# Patient Record
Sex: MALE | Race: WHITE | Employment: FULL TIME | URBAN - METROPOLITAN AREA
[De-identification: names, ages, dates, MRNs, and addresses within clinical notes are randomized per-mention and may not be internally consistent; named-entity substitution may affect disease eponyms.]

---

## 2018-01-08 ENCOUNTER — OFFICE VISIT (OUTPATIENT)
Dept: INTERNAL MEDICINE | Age: 58
End: 2018-01-08

## 2018-01-08 DIAGNOSIS — J44.9 CHRONIC OBSTRUCTIVE PULMONARY DISEASE, UNSPECIFIED COPD TYPE (HCC): ICD-10-CM

## 2018-01-08 DIAGNOSIS — F17.210 CIGARETTE SMOKER: ICD-10-CM

## 2018-01-08 DIAGNOSIS — N30.01 ACUTE CYSTITIS WITH HEMATURIA: Primary | ICD-10-CM

## 2018-01-08 LAB
BILIRUBIN, POC: NORMAL
BLOOD URINE, POC: NORMAL
CLARITY, POC: NORMAL
COLOR, POC: YELLOW
GLUCOSE URINE, POC: NORMAL
KETONES, POC: NORMAL
LEUKOCYTE EST, POC: NORMAL
NITRITE, POC: NORMAL
PH, POC: 8
PROTEIN, POC: NORMAL
SPECIFIC GRAVITY, POC: 1015
UROBILINOGEN, POC: 0.2

## 2018-01-08 PROCEDURE — 99203 OFFICE O/P NEW LOW 30 MIN: CPT | Performed by: INTERNAL MEDICINE

## 2018-01-08 PROCEDURE — 81002 URINALYSIS NONAUTO W/O SCOPE: CPT | Performed by: INTERNAL MEDICINE

## 2018-01-08 RX ORDER — CIPROFLOXACIN 250 MG/1
250 TABLET, FILM COATED ORAL 2 TIMES DAILY
Qty: 20 TABLET | Refills: 0 | Status: SHIPPED | OUTPATIENT
Start: 2018-01-08 | End: 2018-01-18

## 2018-01-09 VITALS
BODY MASS INDEX: 18.13 KG/M2 | DIASTOLIC BLOOD PRESSURE: 70 MMHG | HEIGHT: 69 IN | WEIGHT: 122.4 LBS | HEART RATE: 66 BPM | SYSTOLIC BLOOD PRESSURE: 102 MMHG

## 2018-01-09 ASSESSMENT — ENCOUNTER SYMPTOMS
RESPIRATORY NEGATIVE: 1
ABDOMINAL PAIN: 0

## 2018-01-10 LAB
ORGANISM: ABNORMAL
URINE CULTURE, ROUTINE: ABNORMAL
URINE CULTURE, ROUTINE: ABNORMAL

## 2018-01-18 ENCOUNTER — OFFICE VISIT (OUTPATIENT)
Dept: INTERNAL MEDICINE | Age: 58
End: 2018-01-18

## 2018-01-18 VITALS
WEIGHT: 131.6 LBS | DIASTOLIC BLOOD PRESSURE: 70 MMHG | HEIGHT: 68 IN | BODY MASS INDEX: 19.94 KG/M2 | SYSTOLIC BLOOD PRESSURE: 120 MMHG

## 2018-01-18 DIAGNOSIS — N39.0 URINARY TRACT INFECTION WITHOUT HEMATURIA, SITE UNSPECIFIED: Primary | ICD-10-CM

## 2018-01-18 DIAGNOSIS — A49.8 CITROBACTER INFECTION: ICD-10-CM

## 2018-01-18 DIAGNOSIS — R21 SKIN RASH: ICD-10-CM

## 2018-01-18 LAB
BILIRUBIN, POC: ABNORMAL
BLOOD URINE, POC: ABNORMAL
CLARITY, POC: CLEAR
COLOR, POC: YELLOW
GLUCOSE URINE, POC: ABNORMAL
KETONES, POC: ABNORMAL
LEUKOCYTE EST, POC: ABNORMAL
NITRITE, POC: ABNORMAL
PH, POC: 5
PROTEIN, POC: ABNORMAL
SPECIFIC GRAVITY, POC: 1015
UROBILINOGEN, POC: 0.2

## 2018-01-18 PROCEDURE — 81002 URINALYSIS NONAUTO W/O SCOPE: CPT | Performed by: INTERNAL MEDICINE

## 2018-01-18 PROCEDURE — 99213 OFFICE O/P EST LOW 20 MIN: CPT | Performed by: INTERNAL MEDICINE

## 2018-01-18 RX ORDER — SULFAMETHOXAZOLE AND TRIMETHOPRIM 800; 160 MG/1; MG/1
1 TABLET ORAL 2 TIMES DAILY
Qty: 20 TABLET | Refills: 0 | Status: SHIPPED | OUTPATIENT
Start: 2018-01-18 | End: 2018-01-31 | Stop reason: SDUPTHER

## 2018-01-21 LAB — URINE CULTURE, ROUTINE: NORMAL

## 2018-01-22 NOTE — PROGRESS NOTES
Ray Cerna is a 65 y.o. male patient who was treated on 18/2018 for a urinary tract infection for which he initially had relief. However symptoms have returned and urine again shows WBCs. He has mild discomfort. He continues to have COPD symptoms which are stable and for which he uses an inhaler. Current Outpatient Prescriptions   Medication Sig Dispense Refill    sulfamethoxazole-trimethoprim (BACTRIM DS) 800-160 MG per tablet Take 1 tablet by mouth 2 times daily for 10 days 20 tablet 0    sildenafil (VIAGRA) 100 MG tablet Take 1 tablet by mouth as needed for Erectile Dysfunction. 3 tablet 5    budesonide-formoterol (SYMBICORT) 160-4.5 MCG/ACT AERO Inhale 2 puffs into the lungs 2 times daily. 1 Inhaler 0    albuterol (PROVENTIL HFA) 108 (90 BASE) MCG/ACT inhaler Inhale 2 puffs into the lungs every 4 hours as needed for Wheezing or Shortness of Breath. 1 Inhaler 3     No current facility-administered medications for this visit. No Known Allergies    Blood pressure 120/70, height 5' 8\" (1.727 m), weight 131 lb 9.6 oz (59.7 kg). Subjective  Objective:  General Appearance:  Comfortable. Vital signs: (most recent): Blood pressure 120/70, height 5' 8\" (1.727 m), weight 131 lb 9.6 oz (59.7 kg). Vital signs are normal.  No fever. Output: Producing urine. Lungs:  Normal effort. Heart: Normal rate. Abdomen: Abdomen is soft. (No flank tenderness. ). Assessment & Plan      Although initial urine culture showed Citrobacter,now 10 days later he has recurrent symptoms and significant WBCs. Suggesting continuing infection. I have ordered Cipro 250 mg bid for 10 days   He will report .     Hans Beltran MD  1/22/2018

## 2018-01-31 ENCOUNTER — OFFICE VISIT (OUTPATIENT)
Dept: INTERNAL MEDICINE | Age: 58
End: 2018-01-31

## 2018-01-31 VITALS
HEIGHT: 69 IN | WEIGHT: 126 LBS | BODY MASS INDEX: 18.66 KG/M2 | SYSTOLIC BLOOD PRESSURE: 130 MMHG | DIASTOLIC BLOOD PRESSURE: 68 MMHG

## 2018-01-31 DIAGNOSIS — N30.01 ACUTE CYSTITIS WITH HEMATURIA: Primary | ICD-10-CM

## 2018-01-31 LAB
BILIRUBIN, POC: ABNORMAL
BLOOD URINE, POC: ABNORMAL
CLARITY, POC: ABNORMAL
COLOR, POC: YELLOW
GLUCOSE URINE, POC: ABNORMAL
KETONES, POC: ABNORMAL
LEUKOCYTE EST, POC: ABNORMAL
NITRITE, POC: ABNORMAL
PH, POC: 8
PROTEIN, POC: ABNORMAL
SPECIFIC GRAVITY, POC: 1015
UROBILINOGEN, POC: 0.2

## 2018-01-31 PROCEDURE — 99213 OFFICE O/P EST LOW 20 MIN: CPT | Performed by: INTERNAL MEDICINE

## 2018-01-31 PROCEDURE — 81002 URINALYSIS NONAUTO W/O SCOPE: CPT | Performed by: INTERNAL MEDICINE

## 2018-01-31 RX ORDER — SULFAMETHOXAZOLE AND TRIMETHOPRIM 800; 160 MG/1; MG/1
1 TABLET ORAL 2 TIMES DAILY
Qty: 12 TABLET | Refills: 0 | Status: SHIPPED | OUTPATIENT
Start: 2018-01-31 | End: 2018-02-06

## 2018-01-31 ASSESSMENT — PATIENT HEALTH QUESTIONNAIRE - PHQ9
SUM OF ALL RESPONSES TO PHQ QUESTIONS 1-9: 0
1. LITTLE INTEREST OR PLEASURE IN DOING THINGS: 0
2. FEELING DOWN, DEPRESSED OR HOPELESS: 0
SUM OF ALL RESPONSES TO PHQ9 QUESTIONS 1 & 2: 0

## 2018-01-31 NOTE — PROGRESS NOTES
Subjective:      Patient ID: Miquel Prieto is a 65 y.o. male.     HPI    Review of Systems    Objective:   Physical Exam    Assessment:      ***      Plan:      ***

## 2018-02-02 LAB — URINE CULTURE, ROUTINE: NORMAL

## 2018-02-13 ENCOUNTER — OFFICE VISIT (OUTPATIENT)
Dept: INTERNAL MEDICINE | Age: 58
End: 2018-02-13

## 2018-02-13 VITALS
BODY MASS INDEX: 18.22 KG/M2 | WEIGHT: 123 LBS | HEIGHT: 69 IN | DIASTOLIC BLOOD PRESSURE: 80 MMHG | SYSTOLIC BLOOD PRESSURE: 112 MMHG

## 2018-02-13 DIAGNOSIS — J44.9 CHRONIC OBSTRUCTIVE PULMONARY DISEASE, UNSPECIFIED COPD TYPE (HCC): ICD-10-CM

## 2018-02-13 DIAGNOSIS — G40.909 SEIZURE DISORDER (HCC): Primary | ICD-10-CM

## 2018-02-13 PROCEDURE — 99213 OFFICE O/P EST LOW 20 MIN: CPT | Performed by: INTERNAL MEDICINE

## 2018-02-13 RX ORDER — VARENICLINE TARTRATE 1 MG/1
1 TABLET, FILM COATED ORAL 2 TIMES DAILY
Qty: 60 TABLET | Refills: 3 | Status: SHIPPED | OUTPATIENT
Start: 2018-02-13 | End: 2018-05-29 | Stop reason: SDUPTHER

## 2018-02-13 NOTE — PROGRESS NOTES
Outpatient Follow Up Note    Subjective:   CHIEF COMPLAINT / HPI:     Andrei Brenner  presents today  for follow up on a recent diagnosis of seizure disorder for which he was hospitalized and treated with Keppra. He has had no recurrent seizures and feels well on the medication. He has a history of COPD from cigarette use and has decided to stop smoking. Past Medical History:    Past Medical History:   Diagnosis Date    COPD (chronic obstructive pulmonary disease) ([facility] [address] 4/22/2014    Erectile dysfunction 4/22/2014       Social History:    History   Smoking Status    Current Every Day Smoker    Packs/day: 2.00    Start date: 1/9/1965   Smokeless Tobacco    Never Used         No family history on file. Current Medications:  Current Outpatient Prescriptions on File Prior to Visit   Medication Sig Dispense Refill    sildenafil (VIAGRA) 100 MG tablet Take 1 tablet by mouth as needed for Erectile Dysfunction. 3 tablet 5    budesonide-formoterol (SYMBICORT) 160-4.5 MCG/ACT AERO Inhale 2 puffs into the lungs 2 times daily. 1 Inhaler 0    albuterol (PROVENTIL HFA) 108 (90 BASE) MCG/ACT inhaler    Keppra 500 mg twice a day Inhale 2 puffs into the lungs every 4 hours as needed for Wheezing or Shortness of Breath. 1 Inhaler 3     No current facility-administered medications on file prior to visit. Allergies   Allergen Reactions    Bactrim [Sulfamethoxazole-Trimethoprim] Shortness Of Breath     Pt is extremely allergic to this medication, can cause seizures. REVIEW OF SYSTEMS:      CONSTITUTIONAL: No major weight gain or loss, fatigue, weakness, night sweats or fever. HEENT: No new vision difficulties or ringing in the ears. No congestion or sore throat. RESPIRATORY: No new SOB, PND, orthopnea or cough. CARDIOVASCULAR: No chest pain or palpitations. GI: No nausea, vomiting, diarrhea, constipation, abdominal pain or changes in bowel habits.   : No urinary frequency, urgency, incontinence

## 2018-05-29 RX ORDER — VARENICLINE TARTRATE 1 MG/1
TABLET, FILM COATED ORAL
Qty: 56 TABLET | Refills: 1 | Status: SHIPPED | OUTPATIENT
Start: 2018-05-29 | End: 2020-02-11

## 2019-10-17 ENCOUNTER — HOSPITAL ENCOUNTER (OUTPATIENT)
Age: 59
Discharge: HOME | End: 2019-10-17
Payer: COMMERCIAL

## 2019-10-17 ENCOUNTER — HOSPITAL ENCOUNTER (OUTPATIENT)
Dept: GENERAL RADIOLOGY | Age: 59
Discharge: HOME | End: 2019-10-17
Payer: COMMERCIAL

## 2019-10-17 DIAGNOSIS — M79.18 DIFFUSE MYOFASCIAL PAIN SYNDROME: ICD-10-CM

## 2019-10-17 DIAGNOSIS — M54.16 LUMBAR RADICULOPATHY: ICD-10-CM

## 2019-10-17 DIAGNOSIS — M54.9 DORSALGIA: ICD-10-CM

## 2019-10-17 PROCEDURE — 72110 X-RAY EXAM L-2 SPINE 4/>VWS: CPT

## 2020-02-11 ENCOUNTER — OFFICE VISIT (OUTPATIENT)
Dept: INTERNAL MEDICINE CLINIC | Age: 60
End: 2020-02-11
Payer: COMMERCIAL

## 2020-02-11 VITALS
WEIGHT: 122 LBS | SYSTOLIC BLOOD PRESSURE: 112 MMHG | DIASTOLIC BLOOD PRESSURE: 70 MMHG | HEIGHT: 69 IN | BODY MASS INDEX: 18.07 KG/M2

## 2020-02-11 PROCEDURE — 99396 PREV VISIT EST AGE 40-64: CPT | Performed by: INTERNAL MEDICINE

## 2020-02-11 SDOH — ECONOMIC STABILITY: INCOME INSECURITY: HOW HARD IS IT FOR YOU TO PAY FOR THE VERY BASICS LIKE FOOD, HOUSING, MEDICAL CARE, AND HEATING?: NOT HARD AT ALL

## 2020-02-11 SDOH — ECONOMIC STABILITY: FOOD INSECURITY: WITHIN THE PAST 12 MONTHS, THE FOOD YOU BOUGHT JUST DIDN'T LAST AND YOU DIDN'T HAVE MONEY TO GET MORE.: NEVER TRUE

## 2020-02-11 SDOH — ECONOMIC STABILITY: TRANSPORTATION INSECURITY
IN THE PAST 12 MONTHS, HAS LACK OF TRANSPORTATION KEPT YOU FROM MEETINGS, WORK, OR FROM GETTING THINGS NEEDED FOR DAILY LIVING?: NO

## 2020-02-11 SDOH — ECONOMIC STABILITY: FOOD INSECURITY: WITHIN THE PAST 12 MONTHS, YOU WORRIED THAT YOUR FOOD WOULD RUN OUT BEFORE YOU GOT MONEY TO BUY MORE.: NEVER TRUE

## 2020-02-11 SDOH — ECONOMIC STABILITY: TRANSPORTATION INSECURITY
IN THE PAST 12 MONTHS, HAS THE LACK OF TRANSPORTATION KEPT YOU FROM MEDICAL APPOINTMENTS OR FROM GETTING MEDICATIONS?: NO

## 2020-02-11 ASSESSMENT — PATIENT HEALTH QUESTIONNAIRE - PHQ9
SUM OF ALL RESPONSES TO PHQ QUESTIONS 1-9: 0
1. LITTLE INTEREST OR PLEASURE IN DOING THINGS: 0
2. FEELING DOWN, DEPRESSED OR HOPELESS: 0
SUM OF ALL RESPONSES TO PHQ QUESTIONS 1-9: 0
SUM OF ALL RESPONSES TO PHQ9 QUESTIONS 1 & 2: 0

## 2020-02-11 NOTE — PROGRESS NOTES
Reflexes normal.   Psychiatric:         Speech: Speech normal.         Behavior: Behavior normal. Behavior is cooperative. Thought Content: Thought content normal.         Judgment: Judgment normal.         No flowsheet data found. Lab Results   Component Value Date    GLUCOSE 88 02/14/2020       The ASCVD Risk score (Danii White, et al., 2013) failed to calculate for the following reasons:    Cannot find a previous HDL lab    Cannot find a previous total cholesterol lab      There is no immunization history on file for this patient. Health Maintenance   Topic Date Due    Pneumococcal 0-64 years Vaccine (1 of 1 - PPSV23) 07/03/1967    Low dose CT lung screening  07/03/2016    DTaP/Tdap/Td vaccine (1 - Tdap) 03/03/2020 (Originally 7/3/1972)    Lipid screen  02/11/2021 (Originally 7/3/2001)    Flu vaccine (1) 02/11/2021 (Originally 9/1/2019)    Shingles Vaccine (1 of 2) 02/11/2021 (Originally 7/3/2011)    Hepatitis C screen  02/11/2021 (Originally 1960)    HIV screen  02/11/2021 (Originally 7/3/1976)    Colon cancer screen sigmoidoscopy/CT colonography  04/13/2023    Hepatitis A vaccine  Aged Out    Hepatitis B vaccine  Aged Out    Hib vaccine  Aged Out    Meningococcal (ACWY) vaccine  Aged Out       ASSESSMENT/PLAN:  1. Routine general medical examination at a health care facility  - Discussed age appropriate preventive care including healthy diet, daily exercise, immunizations and age & gender guided screening tests. 2. Screening cholesterol level  - due for check  - Lipid Panel; Future    3. Chronic obstructive pulmonary disease, unspecified COPD type ([facility] [address]  - stable, has quit smoking  - has albuterol he can use if needed      Return in about 1 year (around 2/11/2021) for CPE.       May proceed with planned surgery without further testing  Sukhjinder Harmon 2/18/2020

## 2020-02-14 ENCOUNTER — HOSPITAL ENCOUNTER (OUTPATIENT)
Dept: PREADMISSION TESTING | Age: 60
Discharge: HOME | End: 2020-02-18
Payer: COMMERCIAL

## 2020-02-14 ENCOUNTER — HOSPITAL ENCOUNTER (OUTPATIENT)
Dept: GENERAL RADIOLOGY | Age: 60
Discharge: HOME | End: 2020-02-14
Payer: COMMERCIAL

## 2020-02-14 ENCOUNTER — HOSPITAL ENCOUNTER (OUTPATIENT)
Dept: CT IMAGING | Age: 60
Discharge: HOME | End: 2020-02-14
Payer: COMMERCIAL

## 2020-02-14 LAB
ABO/RH: NORMAL
ANION GAP SERPL CALCULATED.3IONS-SCNC: 15 MMOL/L (ref 3–16)
ANTIBODY SCREEN: NORMAL
APTT: 33 SEC (ref 24.2–36.2)
BASOPHILS ABSOLUTE: 0.1 K/UL (ref 0–0.2)
BASOPHILS RELATIVE PERCENT: 1.9 %
BILIRUBIN URINE: NEGATIVE
BLOOD, URINE: NEGATIVE
BUN BLDV-MCNC: 7 MG/DL (ref 7–20)
CALCIUM SERPL-MCNC: 9.1 MG/DL (ref 8.3–10.6)
CHLORIDE BLD-SCNC: 98 MMOL/L (ref 99–110)
CLARITY: CLEAR
CO2: 28 MMOL/L (ref 21–32)
COLOR: YELLOW
CREAT SERPL-MCNC: 0.5 MG/DL (ref 0.9–1.3)
EOSINOPHILS ABSOLUTE: 0.1 K/UL (ref 0–0.6)
EOSINOPHILS RELATIVE PERCENT: 1 %
EPITHELIAL CELLS, UA: 0 /HPF (ref 0–5)
GFR AFRICAN AMERICAN: >60
GFR NON-AFRICAN AMERICAN: >60
GLUCOSE BLD-MCNC: 88 MG/DL (ref 70–99)
GLUCOSE URINE: NEGATIVE MG/DL
HCT VFR BLD CALC: 49.4 % (ref 40.5–52.5)
HEMOGLOBIN: 16.7 G/DL (ref 13.5–17.5)
HYALINE CASTS: 1 /LPF (ref 0–8)
INR BLD: 0.86 (ref 0.86–1.14)
KETONES, URINE: ABNORMAL MG/DL
LEUKOCYTE ESTERASE, URINE: NEGATIVE
LYMPHOCYTES ABSOLUTE: 1.2 K/UL (ref 1–5.1)
LYMPHOCYTES RELATIVE PERCENT: 23.8 %
MCH RBC QN AUTO: 33.9 PG (ref 26–34)
MCHC RBC AUTO-ENTMCNC: 33.8 G/DL (ref 31–36)
MCV RBC AUTO: 100.2 FL (ref 80–100)
MICROSCOPIC EXAMINATION: YES
MONOCYTES ABSOLUTE: 0.4 K/UL (ref 0–1.3)
MONOCYTES RELATIVE PERCENT: 8.6 %
NEUTROPHILS ABSOLUTE: 3.3 K/UL (ref 1.7–7.7)
NEUTROPHILS RELATIVE PERCENT: 64.7 %
NITRITE, URINE: NEGATIVE
PDW BLD-RTO: 13 % (ref 12.4–15.4)
PH UA: 6 (ref 5–8)
PLATELET # BLD: 216 K/UL (ref 135–450)
PMV BLD AUTO: 7.5 FL (ref 5–10.5)
POTASSIUM SERPL-SCNC: 4.3 MMOL/L (ref 3.5–5.1)
PROTEIN UA: 30 MG/DL
PROTHROMBIN TIME: 10 SEC (ref 10–13.2)
RBC # BLD: 4.93 M/UL (ref 4.2–5.9)
RBC UA: 0 /HPF (ref 0–4)
SEDIMENTATION RATE, ERYTHROCYTE: 2 MM/HR (ref 0–20)
SODIUM BLD-SCNC: 141 MMOL/L (ref 136–145)
SPECIFIC GRAVITY UA: 1.01 (ref 1–1.03)
URINE REFLEX TO CULTURE: ABNORMAL
URINE TYPE: ABNORMAL
UROBILINOGEN, URINE: 1 E.U./DL
WBC # BLD: 5.1 K/UL (ref 4–11)
WBC UA: 0 /HPF (ref 0–5)

## 2020-02-14 PROCEDURE — 86850 RBC ANTIBODY SCREEN: CPT

## 2020-02-14 PROCEDURE — 85610 PROTHROMBIN TIME: CPT

## 2020-02-14 PROCEDURE — 85652 RBC SED RATE AUTOMATED: CPT

## 2020-02-14 PROCEDURE — 86901 BLOOD TYPING SEROLOGIC RH(D): CPT

## 2020-02-14 PROCEDURE — 87641 MR-STAPH DNA AMP PROBE: CPT

## 2020-02-14 PROCEDURE — 93005 ELECTROCARDIOGRAM TRACING: CPT | Performed by: NEUROLOGICAL SURGERY

## 2020-02-14 PROCEDURE — 72100 X-RAY EXAM L-S SPINE 2/3 VWS: CPT

## 2020-02-14 PROCEDURE — 81001 URINALYSIS AUTO W/SCOPE: CPT

## 2020-02-14 PROCEDURE — 72131 CT LUMBAR SPINE W/O DYE: CPT

## 2020-02-14 PROCEDURE — 85025 COMPLETE CBC W/AUTO DIFF WBC: CPT

## 2020-02-14 PROCEDURE — 80048 BASIC METABOLIC PNL TOTAL CA: CPT

## 2020-02-14 PROCEDURE — 86900 BLOOD TYPING SEROLOGIC ABO: CPT

## 2020-02-14 PROCEDURE — 85730 THROMBOPLASTIN TIME PARTIAL: CPT

## 2020-02-14 RX ORDER — METHOCARBAMOL 500 MG/1
500 TABLET, FILM COATED ORAL DAILY
Status: ON HOLD | COMMUNITY
End: 2020-02-20 | Stop reason: SDUPTHER

## 2020-02-15 LAB
EKG ATRIAL RATE: 94 BPM
EKG DIAGNOSIS: NORMAL
EKG P AXIS: 80 DEGREES
EKG P-R INTERVAL: 160 MS
EKG Q-T INTERVAL: 390 MS
EKG QRS DURATION: 96 MS
EKG QTC CALCULATION (BAZETT): 487 MS
EKG R AXIS: 74 DEGREES
EKG T AXIS: 81 DEGREES
EKG VENTRICULAR RATE: 94 BPM
MRSA SCREEN RT-PCR: NORMAL

## 2020-02-15 PROCEDURE — 93010 ELECTROCARDIOGRAM REPORT: CPT | Performed by: INTERNAL MEDICINE

## 2020-02-17 ASSESSMENT — ENCOUNTER SYMPTOMS
BACK PAIN: 1
EYES NEGATIVE: 1
SHORTNESS OF BREATH: 0
GASTROINTESTINAL NEGATIVE: 1
COUGH: 0
WHEEZING: 0

## 2020-02-18 ENCOUNTER — ANESTHESIA EVENT (OUTPATIENT)
Dept: OPERATING ROOM | Age: 60
DRG: 455 | End: 2020-02-18
Payer: COMMERCIAL

## 2020-02-18 ENCOUNTER — TELEPHONE (OUTPATIENT)
Dept: INTERNAL MEDICINE CLINIC | Age: 60
End: 2020-02-18

## 2020-02-19 ENCOUNTER — APPOINTMENT (OUTPATIENT)
Dept: GENERAL RADIOLOGY | Age: 60
DRG: 455 | End: 2020-02-19
Attending: NEUROLOGICAL SURGERY
Payer: COMMERCIAL

## 2020-02-19 ENCOUNTER — ANESTHESIA (OUTPATIENT)
Dept: OPERATING ROOM | Age: 60
DRG: 455 | End: 2020-02-19
Payer: COMMERCIAL

## 2020-02-19 ENCOUNTER — HOSPITAL ENCOUNTER (INPATIENT)
Age: 60
LOS: 2 days | Discharge: HOME | DRG: 455 | End: 2020-02-21
Attending: NEUROLOGICAL SURGERY | Admitting: NEUROLOGICAL SURGERY
Payer: COMMERCIAL

## 2020-02-19 VITALS
SYSTOLIC BLOOD PRESSURE: 113 MMHG | DIASTOLIC BLOOD PRESSURE: 72 MMHG | OXYGEN SATURATION: 99 % | TEMPERATURE: 98.4 F | RESPIRATION RATE: 1 BRPM

## 2020-02-19 PROBLEM — M48.062 LUMBAR STENOSIS WITH NEUROGENIC CLAUDICATION: Status: ACTIVE | Noted: 2020-02-19

## 2020-02-19 LAB
ABO/RH: NORMAL
ANTIBODY SCREEN: NORMAL

## 2020-02-19 PROCEDURE — 01NB0ZZ RELEASE LUMBAR NERVE, OPEN APPROACH: ICD-10-PCS | Performed by: NEUROLOGICAL SURGERY

## 2020-02-19 PROCEDURE — 2580000003 HC RX 258: Performed by: NEUROLOGICAL SURGERY

## 2020-02-19 PROCEDURE — 6360000002 HC RX W HCPCS: Performed by: ANESTHESIOLOGY

## 2020-02-19 PROCEDURE — 6370000000 HC RX 637 (ALT 250 FOR IP): Performed by: NEUROLOGICAL SURGERY

## 2020-02-19 PROCEDURE — 3600000005 HC SURGERY LEVEL 5 BASE: Performed by: NEUROLOGICAL SURGERY

## 2020-02-19 PROCEDURE — 2780000010 HC IMPLANT OTHER: Performed by: NEUROLOGICAL SURGERY

## 2020-02-19 PROCEDURE — 3209999900 FLUORO FOR SURGICAL PROCEDURES

## 2020-02-19 PROCEDURE — 86901 BLOOD TYPING SEROLOGIC RH(D): CPT

## 2020-02-19 PROCEDURE — 3700000001 HC ADD 15 MINUTES (ANESTHESIA): Performed by: NEUROLOGICAL SURGERY

## 2020-02-19 PROCEDURE — 6360000002 HC RX W HCPCS: Performed by: NURSE ANESTHETIST, CERTIFIED REGISTERED

## 2020-02-19 PROCEDURE — 2720000010 HC SURG SUPPLY STERILE: Performed by: NEUROLOGICAL SURGERY

## 2020-02-19 PROCEDURE — 6360000002 HC RX W HCPCS: Performed by: NEUROLOGICAL SURGERY

## 2020-02-19 PROCEDURE — 86850 RBC ANTIBODY SCREEN: CPT

## 2020-02-19 PROCEDURE — 3700000000 HC ANESTHESIA ATTENDED CARE: Performed by: NEUROLOGICAL SURGERY

## 2020-02-19 PROCEDURE — 1200000000 HC SEMI PRIVATE

## 2020-02-19 PROCEDURE — 86900 BLOOD TYPING SEROLOGIC ABO: CPT

## 2020-02-19 PROCEDURE — 7100000001 HC PACU RECOVERY - ADDTL 15 MIN: Performed by: NEUROLOGICAL SURGERY

## 2020-02-19 PROCEDURE — C1713 ANCHOR/SCREW BN/BN,TIS/BN: HCPCS | Performed by: NEUROLOGICAL SURGERY

## 2020-02-19 PROCEDURE — 2580000003 HC RX 258: Performed by: ANESTHESIOLOGY

## 2020-02-19 PROCEDURE — 3600000015 HC SURGERY LEVEL 5 ADDTL 15MIN: Performed by: NEUROLOGICAL SURGERY

## 2020-02-19 PROCEDURE — 88304 TISSUE EXAM BY PATHOLOGIST: CPT

## 2020-02-19 PROCEDURE — 0SB20ZZ EXCISION OF LUMBAR VERTEBRAL DISC, OPEN APPROACH: ICD-10-PCS | Performed by: NEUROLOGICAL SURGERY

## 2020-02-19 PROCEDURE — 2500000003 HC RX 250 WO HCPCS: Performed by: NURSE ANESTHETIST, CERTIFIED REGISTERED

## 2020-02-19 PROCEDURE — 2500000003 HC RX 250 WO HCPCS

## 2020-02-19 PROCEDURE — 2500000003 HC RX 250 WO HCPCS: Performed by: NEUROLOGICAL SURGERY

## 2020-02-19 PROCEDURE — 0SG00AJ FUSION OF LUMBAR VERTEBRAL JOINT WITH INTERBODY FUSION DEVICE, POSTERIOR APPROACH, ANTERIOR COLUMN, OPEN APPROACH: ICD-10-PCS | Performed by: NEUROLOGICAL SURGERY

## 2020-02-19 PROCEDURE — 7100000000 HC PACU RECOVERY - FIRST 15 MIN: Performed by: NEUROLOGICAL SURGERY

## 2020-02-19 PROCEDURE — 0SG0071 FUSION OF LUMBAR VERTEBRAL JOINT WITH AUTOLOGOUS TISSUE SUBSTITUTE, POSTERIOR APPROACH, POSTERIOR COLUMN, OPEN APPROACH: ICD-10-PCS | Performed by: NEUROLOGICAL SURGERY

## 2020-02-19 PROCEDURE — 2709999900 HC NON-CHARGEABLE SUPPLY: Performed by: NEUROLOGICAL SURGERY

## 2020-02-19 PROCEDURE — 6360000002 HC RX W HCPCS

## 2020-02-19 PROCEDURE — 6370000000 HC RX 637 (ALT 250 FOR IP)

## 2020-02-19 PROCEDURE — 72020 X-RAY EXAM OF SPINE 1 VIEW: CPT

## 2020-02-19 DEVICE — SPACER SPNL 0.3CC W8XH8X6XL22MM PEEK CNVX BULL TIP TANT MRK: Type: IMPLANTABLE DEVICE | Site: BACK | Status: FUNCTIONAL

## 2020-02-19 DEVICE — SCREW SPNL L50MM DIA6.5MM THORLUM TI ALLY POLYAX STREAMLINE: Type: IMPLANTABLE DEVICE | Site: BACK | Status: FUNCTIONAL

## 2020-02-19 DEVICE — ROD SPNL PREBENT 40 MM: Type: IMPLANTABLE DEVICE | Site: BACK | Status: FUNCTIONAL

## 2020-02-19 DEVICE — I-FACTOR PUTTY, 1.0CC SYRINGE
Type: IMPLANTABLE DEVICE | Site: BACK | Status: FUNCTIONAL
Brand: I-FACTOR PEPTIDE ENHANCED BONE GRAFT

## 2020-02-19 DEVICE — SET SCR SPNL TI STREAMLINE: Type: IMPLANTABLE DEVICE | Site: BACK | Status: FUNCTIONAL

## 2020-02-19 RX ORDER — SUCCINYLCHOLINE/SOD CL,ISO/PF 200MG/10ML
SYRINGE (ML) INTRAVENOUS PRN
Status: DISCONTINUED | OUTPATIENT
Start: 2020-02-19 | End: 2020-02-19 | Stop reason: SDUPTHER

## 2020-02-19 RX ORDER — PROPOFOL 10 MG/ML
INJECTION, EMULSION INTRAVENOUS PRN
Status: DISCONTINUED | OUTPATIENT
Start: 2020-02-19 | End: 2020-02-19 | Stop reason: SDUPTHER

## 2020-02-19 RX ORDER — METHOCARBAMOL 750 MG/1
750 TABLET, FILM COATED ORAL 4 TIMES DAILY
Status: DISCONTINUED | OUTPATIENT
Start: 2020-02-19 | End: 2020-02-21 | Stop reason: HOSPADM

## 2020-02-19 RX ORDER — ONDANSETRON 2 MG/ML
INJECTION INTRAMUSCULAR; INTRAVENOUS PRN
Status: DISCONTINUED | OUTPATIENT
Start: 2020-02-19 | End: 2020-02-19 | Stop reason: SDUPTHER

## 2020-02-19 RX ORDER — FENTANYL CITRATE 50 UG/ML
25 INJECTION, SOLUTION INTRAMUSCULAR; INTRAVENOUS EVERY 5 MIN PRN
Status: DISCONTINUED | OUTPATIENT
Start: 2020-02-19 | End: 2020-02-19 | Stop reason: HOSPADM

## 2020-02-19 RX ORDER — MIDAZOLAM HYDROCHLORIDE 1 MG/ML
INJECTION INTRAMUSCULAR; INTRAVENOUS PRN
Status: DISCONTINUED | OUTPATIENT
Start: 2020-02-19 | End: 2020-02-19 | Stop reason: SDUPTHER

## 2020-02-19 RX ORDER — GINSENG 100 MG
CAPSULE ORAL
Status: COMPLETED | OUTPATIENT
Start: 2020-02-19 | End: 2020-02-19

## 2020-02-19 RX ORDER — CEFAZOLIN SODIUM 2 G/50ML
2 SOLUTION INTRAVENOUS ONCE
Status: COMPLETED | OUTPATIENT
Start: 2020-02-19 | End: 2020-02-19

## 2020-02-19 RX ORDER — OXYCODONE HYDROCHLORIDE AND ACETAMINOPHEN 5; 325 MG/1; MG/1
2 TABLET ORAL PRN
Status: DISCONTINUED | OUTPATIENT
Start: 2020-02-19 | End: 2020-02-19 | Stop reason: HOSPADM

## 2020-02-19 RX ORDER — LIDOCAINE HYDROCHLORIDE 40 MG/ML
SOLUTION TOPICAL PRN
Status: DISCONTINUED | OUTPATIENT
Start: 2020-02-19 | End: 2020-02-19 | Stop reason: SDUPTHER

## 2020-02-19 RX ORDER — ALBUTEROL SULFATE 90 UG/1
2 AEROSOL, METERED RESPIRATORY (INHALATION) EVERY 4 HOURS PRN
Status: DISCONTINUED | OUTPATIENT
Start: 2020-02-19 | End: 2020-02-21 | Stop reason: HOSPADM

## 2020-02-19 RX ORDER — OXYCODONE HYDROCHLORIDE 5 MG/1
5 TABLET ORAL EVERY 4 HOURS PRN
Status: DISCONTINUED | OUTPATIENT
Start: 2020-02-19 | End: 2020-02-21 | Stop reason: HOSPADM

## 2020-02-19 RX ORDER — FAMOTIDINE 20 MG/1
20 TABLET, FILM COATED ORAL 2 TIMES DAILY
Status: DISCONTINUED | OUTPATIENT
Start: 2020-02-19 | End: 2020-02-21 | Stop reason: HOSPADM

## 2020-02-19 RX ORDER — DIAZEPAM 5 MG/1
5 TABLET ORAL EVERY 8 HOURS PRN
Status: DISCONTINUED | OUTPATIENT
Start: 2020-02-19 | End: 2020-02-21 | Stop reason: HOSPADM

## 2020-02-19 RX ORDER — ACETAMINOPHEN 325 MG/1
650 TABLET ORAL EVERY 4 HOURS PRN
Status: DISCONTINUED | OUTPATIENT
Start: 2020-02-19 | End: 2020-02-21 | Stop reason: HOSPADM

## 2020-02-19 RX ORDER — SODIUM CHLORIDE 0.9 % (FLUSH) 0.9 %
10 SYRINGE (ML) INJECTION EVERY 12 HOURS SCHEDULED
Status: DISCONTINUED | OUTPATIENT
Start: 2020-02-19 | End: 2020-02-21 | Stop reason: HOSPADM

## 2020-02-19 RX ORDER — SODIUM CHLORIDE 0.9 % (FLUSH) 0.9 %
10 SYRINGE (ML) INJECTION PRN
Status: DISCONTINUED | OUTPATIENT
Start: 2020-02-19 | End: 2020-02-19 | Stop reason: HOSPADM

## 2020-02-19 RX ORDER — DEXAMETHASONE SODIUM PHOSPHATE 4 MG/ML
INJECTION, SOLUTION INTRA-ARTICULAR; INTRALESIONAL; INTRAMUSCULAR; INTRAVENOUS; SOFT TISSUE PRN
Status: DISCONTINUED | OUTPATIENT
Start: 2020-02-19 | End: 2020-02-19 | Stop reason: SDUPTHER

## 2020-02-19 RX ORDER — ACETAMINOPHEN 10 MG/ML
INJECTION, SOLUTION INTRAVENOUS PRN
Status: DISCONTINUED | OUTPATIENT
Start: 2020-02-19 | End: 2020-02-19 | Stop reason: SDUPTHER

## 2020-02-19 RX ORDER — SODIUM CHLORIDE 0.9 % (FLUSH) 0.9 %
10 SYRINGE (ML) INJECTION PRN
Status: DISCONTINUED | OUTPATIENT
Start: 2020-02-19 | End: 2020-02-21 | Stop reason: HOSPADM

## 2020-02-19 RX ORDER — LIDOCAINE HYDROCHLORIDE 20 MG/ML
INJECTION, SOLUTION EPIDURAL; INFILTRATION; INTRACAUDAL; PERINEURAL PRN
Status: DISCONTINUED | OUTPATIENT
Start: 2020-02-19 | End: 2020-02-19 | Stop reason: SDUPTHER

## 2020-02-19 RX ORDER — SODIUM CHLORIDE 9 MG/ML
INJECTION, SOLUTION INTRAVENOUS CONTINUOUS
Status: DISCONTINUED | OUTPATIENT
Start: 2020-02-19 | End: 2020-02-21 | Stop reason: HOSPADM

## 2020-02-19 RX ORDER — SODIUM CHLORIDE 0.9 % (FLUSH) 0.9 %
10 SYRINGE (ML) INJECTION EVERY 12 HOURS SCHEDULED
Status: DISCONTINUED | OUTPATIENT
Start: 2020-02-19 | End: 2020-02-19 | Stop reason: HOSPADM

## 2020-02-19 RX ORDER — BUPIVACAINE HYDROCHLORIDE AND EPINEPHRINE 5; 5 MG/ML; UG/ML
INJECTION, SOLUTION EPIDURAL; INTRACAUDAL; PERINEURAL
Status: COMPLETED | OUTPATIENT
Start: 2020-02-19 | End: 2020-02-19

## 2020-02-19 RX ORDER — ONDANSETRON 2 MG/ML
4 INJECTION INTRAMUSCULAR; INTRAVENOUS EVERY 6 HOURS PRN
Status: DISCONTINUED | OUTPATIENT
Start: 2020-02-19 | End: 2020-02-21 | Stop reason: HOSPADM

## 2020-02-19 RX ORDER — MAGNESIUM HYDROXIDE/ALUMINUM HYDROXICE/SIMETHICONE 120; 1200; 1200 MG/30ML; MG/30ML; MG/30ML
15 SUSPENSION ORAL EVERY 6 HOURS PRN
Status: DISCONTINUED | OUTPATIENT
Start: 2020-02-19 | End: 2020-02-21 | Stop reason: HOSPADM

## 2020-02-19 RX ORDER — OXYCODONE HYDROCHLORIDE 10 MG/1
10 TABLET ORAL EVERY 4 HOURS PRN
Status: DISCONTINUED | OUTPATIENT
Start: 2020-02-19 | End: 2020-02-21 | Stop reason: HOSPADM

## 2020-02-19 RX ORDER — FENTANYL CITRATE 50 UG/ML
INJECTION, SOLUTION INTRAMUSCULAR; INTRAVENOUS PRN
Status: DISCONTINUED | OUTPATIENT
Start: 2020-02-19 | End: 2020-02-19 | Stop reason: SDUPTHER

## 2020-02-19 RX ORDER — OXYCODONE HYDROCHLORIDE AND ACETAMINOPHEN 5; 325 MG/1; MG/1
1 TABLET ORAL PRN
Status: DISCONTINUED | OUTPATIENT
Start: 2020-02-19 | End: 2020-02-19 | Stop reason: HOSPADM

## 2020-02-19 RX ORDER — ROCURONIUM BROMIDE 10 MG/ML
INJECTION, SOLUTION INTRAVENOUS PRN
Status: DISCONTINUED | OUTPATIENT
Start: 2020-02-19 | End: 2020-02-19 | Stop reason: SDUPTHER

## 2020-02-19 RX ORDER — PHENYLEPHRINE HCL IN 0.9% NACL 1 MG/10 ML
SYRINGE (ML) INTRAVENOUS PRN
Status: DISCONTINUED | OUTPATIENT
Start: 2020-02-19 | End: 2020-02-19 | Stop reason: SDUPTHER

## 2020-02-19 RX ORDER — ONDANSETRON 2 MG/ML
4 INJECTION INTRAMUSCULAR; INTRAVENOUS
Status: DISCONTINUED | OUTPATIENT
Start: 2020-02-19 | End: 2020-02-19 | Stop reason: HOSPADM

## 2020-02-19 RX ORDER — SODIUM CHLORIDE 9 MG/ML
INJECTION, SOLUTION INTRAVENOUS CONTINUOUS
Status: DISCONTINUED | OUTPATIENT
Start: 2020-02-19 | End: 2020-02-19

## 2020-02-19 RX ADMIN — ACETAMINOPHEN 1000 MG: 10 INJECTION, SOLUTION INTRAVENOUS at 08:27

## 2020-02-19 RX ADMIN — METHOCARBAMOL TABLETS 750 MG: 750 TABLET, COATED ORAL at 20:18

## 2020-02-19 RX ADMIN — OXYCODONE HYDROCHLORIDE 10 MG: 10 TABLET ORAL at 15:07

## 2020-02-19 RX ADMIN — FAMOTIDINE 20 MG: 20 TABLET, FILM COATED ORAL at 20:18

## 2020-02-19 RX ADMIN — FENTANYL CITRATE 50 MCG: 50 INJECTION INTRAMUSCULAR; INTRAVENOUS at 09:37

## 2020-02-19 RX ADMIN — ROCURONIUM BROMIDE 30 MG: 10 INJECTION INTRAVENOUS at 08:10

## 2020-02-19 RX ADMIN — OXYCODONE HYDROCHLORIDE 10 MG: 10 TABLET ORAL at 20:18

## 2020-02-19 RX ADMIN — CEFAZOLIN SODIUM 2 G: 2 SOLUTION INTRAVENOUS at 07:46

## 2020-02-19 RX ADMIN — DEXAMETHASONE SODIUM PHOSPHATE 8 MG: 4 INJECTION, SOLUTION INTRAMUSCULAR; INTRAVENOUS at 08:00

## 2020-02-19 RX ADMIN — FENTANYL CITRATE 50 MCG: 50 INJECTION INTRAMUSCULAR; INTRAVENOUS at 07:55

## 2020-02-19 RX ADMIN — HYDROMORPHONE HYDROCHLORIDE 0.5 MG: 1 INJECTION, SOLUTION INTRAMUSCULAR; INTRAVENOUS; SUBCUTANEOUS at 11:20

## 2020-02-19 RX ADMIN — SUGAMMADEX 125 MG: 100 INJECTION, SOLUTION INTRAVENOUS at 08:56

## 2020-02-19 RX ADMIN — METHOCARBAMOL TABLETS 750 MG: 750 TABLET, COATED ORAL at 17:14

## 2020-02-19 RX ADMIN — PROPOFOL 120 MG: 10 INJECTION, EMULSION INTRAVENOUS at 07:39

## 2020-02-19 RX ADMIN — SODIUM CHLORIDE: 9 INJECTION, SOLUTION INTRAVENOUS at 07:36

## 2020-02-19 RX ADMIN — Medication 2 MCG/KG/HR: at 08:01

## 2020-02-19 RX ADMIN — Medication 100 MCG: at 09:09

## 2020-02-19 RX ADMIN — FENTANYL CITRATE 50 MCG: 50 INJECTION INTRAMUSCULAR; INTRAVENOUS at 07:39

## 2020-02-19 RX ADMIN — LIDOCAINE HYDROCHLORIDE 4 ML: 40 SOLUTION TOPICAL at 07:44

## 2020-02-19 RX ADMIN — Medication 140 MG: at 07:42

## 2020-02-19 RX ADMIN — LIDOCAINE HYDROCHLORIDE 60 MG: 20 INJECTION, SOLUTION EPIDURAL; INFILTRATION; INTRACAUDAL; PERINEURAL at 07:39

## 2020-02-19 RX ADMIN — FENTANYL CITRATE 50 MCG: 50 INJECTION INTRAMUSCULAR; INTRAVENOUS at 09:08

## 2020-02-19 RX ADMIN — VANCOMYCIN HYDROCHLORIDE 750 MG: 750 INJECTION, POWDER, LYOPHILIZED, FOR SOLUTION INTRAVENOUS at 15:07

## 2020-02-19 RX ADMIN — Medication 100 MCG: at 09:15

## 2020-02-19 RX ADMIN — METHOCARBAMOL 1000 MG: 100 INJECTION, SOLUTION INTRAMUSCULAR; INTRAVENOUS at 08:51

## 2020-02-19 RX ADMIN — LIDOCAINE HYDROCHLORIDE 60 MG: 20 INJECTION, SOLUTION EPIDURAL; INFILTRATION; INTRACAUDAL; PERINEURAL at 10:28

## 2020-02-19 RX ADMIN — ROCURONIUM BROMIDE 20 MG: 10 INJECTION INTRAVENOUS at 08:13

## 2020-02-19 RX ADMIN — FENTANYL CITRATE 25 MCG: 50 INJECTION, SOLUTION INTRAMUSCULAR; INTRAVENOUS at 11:44

## 2020-02-19 RX ADMIN — SODIUM CHLORIDE: 9 INJECTION, SOLUTION INTRAVENOUS at 15:07

## 2020-02-19 RX ADMIN — SODIUM CHLORIDE: 9 INJECTION, SOLUTION INTRAVENOUS at 09:15

## 2020-02-19 RX ADMIN — FENTANYL CITRATE 25 MCG: 50 INJECTION, SOLUTION INTRAMUSCULAR; INTRAVENOUS at 12:35

## 2020-02-19 RX ADMIN — ONDANSETRON 4 MG: 2 INJECTION INTRAMUSCULAR; INTRAVENOUS at 10:14

## 2020-02-19 RX ADMIN — Medication 100 MCG: at 09:02

## 2020-02-19 RX ADMIN — FENTANYL CITRATE 50 MCG: 50 INJECTION INTRAMUSCULAR; INTRAVENOUS at 10:03

## 2020-02-19 RX ADMIN — FENTANYL CITRATE 25 MCG: 50 INJECTION INTRAMUSCULAR; INTRAVENOUS at 10:28

## 2020-02-19 RX ADMIN — FENTANYL CITRATE 25 MCG: 50 INJECTION INTRAMUSCULAR; INTRAVENOUS at 10:43

## 2020-02-19 RX ADMIN — MIDAZOLAM 2 MG: 1 INJECTION INTRAMUSCULAR; INTRAVENOUS at 07:34

## 2020-02-19 ASSESSMENT — PULMONARY FUNCTION TESTS
PIF_VALUE: 18
PIF_VALUE: 14
PIF_VALUE: 2
PIF_VALUE: 3
PIF_VALUE: 21
PIF_VALUE: 3
PIF_VALUE: 15
PIF_VALUE: 18
PIF_VALUE: 6
PIF_VALUE: 18
PIF_VALUE: 13
PIF_VALUE: 14
PIF_VALUE: 17
PIF_VALUE: 18
PIF_VALUE: 0
PIF_VALUE: 18
PIF_VALUE: 6
PIF_VALUE: 29
PIF_VALUE: 15
PIF_VALUE: 15
PIF_VALUE: 13
PIF_VALUE: 6
PIF_VALUE: 22
PIF_VALUE: 17
PIF_VALUE: 6
PIF_VALUE: 0
PIF_VALUE: 15
PIF_VALUE: 1
PIF_VALUE: 16
PIF_VALUE: 15
PIF_VALUE: 18
PIF_VALUE: 16
PIF_VALUE: 3
PIF_VALUE: 19
PIF_VALUE: 18
PIF_VALUE: 17
PIF_VALUE: 20
PIF_VALUE: 17
PIF_VALUE: 18
PIF_VALUE: 19
PIF_VALUE: 20
PIF_VALUE: 6
PIF_VALUE: 17
PIF_VALUE: 18
PIF_VALUE: 1
PIF_VALUE: 16
PIF_VALUE: 15
PIF_VALUE: 14
PIF_VALUE: 18
PIF_VALUE: 4
PIF_VALUE: 18
PIF_VALUE: 3
PIF_VALUE: 16
PIF_VALUE: 18
PIF_VALUE: 19
PIF_VALUE: 15
PIF_VALUE: 0
PIF_VALUE: 16
PIF_VALUE: 19
PIF_VALUE: 18
PIF_VALUE: 7
PIF_VALUE: 4
PIF_VALUE: 14
PIF_VALUE: 22
PIF_VALUE: 30
PIF_VALUE: 1
PIF_VALUE: 21
PIF_VALUE: 20
PIF_VALUE: 16
PIF_VALUE: 20
PIF_VALUE: 16
PIF_VALUE: 21
PIF_VALUE: 16
PIF_VALUE: 17
PIF_VALUE: 0
PIF_VALUE: 18
PIF_VALUE: 18
PIF_VALUE: 3
PIF_VALUE: 16
PIF_VALUE: 15
PIF_VALUE: 20
PIF_VALUE: 20
PIF_VALUE: 18
PIF_VALUE: 17
PIF_VALUE: 21
PIF_VALUE: 15
PIF_VALUE: 16
PIF_VALUE: 15
PIF_VALUE: 18
PIF_VALUE: 17
PIF_VALUE: 0
PIF_VALUE: 16
PIF_VALUE: 6
PIF_VALUE: 17
PIF_VALUE: 15
PIF_VALUE: 16
PIF_VALUE: 14
PIF_VALUE: 16
PIF_VALUE: 19
PIF_VALUE: 17
PIF_VALUE: 15
PIF_VALUE: 18
PIF_VALUE: 19
PIF_VALUE: 6
PIF_VALUE: 3
PIF_VALUE: 18
PIF_VALUE: 21
PIF_VALUE: 18
PIF_VALUE: 20
PIF_VALUE: 15
PIF_VALUE: 16
PIF_VALUE: 1
PIF_VALUE: 3
PIF_VALUE: 19
PIF_VALUE: 18
PIF_VALUE: 20
PIF_VALUE: 0
PIF_VALUE: 17
PIF_VALUE: 18
PIF_VALUE: 0
PIF_VALUE: 19
PIF_VALUE: 17
PIF_VALUE: 15
PIF_VALUE: 15
PIF_VALUE: 3
PIF_VALUE: 17
PIF_VALUE: 17
PIF_VALUE: 16
PIF_VALUE: 16
PIF_VALUE: 18
PIF_VALUE: 18
PIF_VALUE: 15
PIF_VALUE: 20
PIF_VALUE: 12
PIF_VALUE: 15
PIF_VALUE: 10
PIF_VALUE: 19
PIF_VALUE: 17
PIF_VALUE: 19
PIF_VALUE: 3
PIF_VALUE: 17
PIF_VALUE: 20
PIF_VALUE: 21
PIF_VALUE: 3
PIF_VALUE: 19
PIF_VALUE: 17
PIF_VALUE: 18
PIF_VALUE: 3
PIF_VALUE: 18
PIF_VALUE: 20
PIF_VALUE: 17
PIF_VALUE: 20
PIF_VALUE: 16
PIF_VALUE: 18
PIF_VALUE: 15
PIF_VALUE: 18
PIF_VALUE: 19
PIF_VALUE: 16
PIF_VALUE: 14
PIF_VALUE: 18
PIF_VALUE: 17
PIF_VALUE: 20
PIF_VALUE: 20
PIF_VALUE: 21
PIF_VALUE: 6
PIF_VALUE: 21
PIF_VALUE: 3
PIF_VALUE: 3
PIF_VALUE: 17
PIF_VALUE: 20
PIF_VALUE: 3
PIF_VALUE: 18
PIF_VALUE: 15
PIF_VALUE: 19
PIF_VALUE: 19
PIF_VALUE: 22
PIF_VALUE: 18
PIF_VALUE: 21
PIF_VALUE: 15
PIF_VALUE: 16
PIF_VALUE: 17
PIF_VALUE: 17
PIF_VALUE: 16
PIF_VALUE: 16
PIF_VALUE: 19
PIF_VALUE: 20
PIF_VALUE: 15

## 2020-02-19 ASSESSMENT — PAIN DESCRIPTION - ORIENTATION
ORIENTATION: LOWER
ORIENTATION: LOWER
ORIENTATION: LOWER;MID
ORIENTATION: LOWER

## 2020-02-19 ASSESSMENT — PAIN - FUNCTIONAL ASSESSMENT
PAIN_FUNCTIONAL_ASSESSMENT: PREVENTS OR INTERFERES SOME ACTIVE ACTIVITIES AND ADLS
PAIN_FUNCTIONAL_ASSESSMENT: PREVENTS OR INTERFERES SOME ACTIVE ACTIVITIES AND ADLS
PAIN_FUNCTIONAL_ASSESSMENT: 0-10
PAIN_FUNCTIONAL_ASSESSMENT: PREVENTS OR INTERFERES SOME ACTIVE ACTIVITIES AND ADLS
PAIN_FUNCTIONAL_ASSESSMENT: PREVENTS OR INTERFERES SOME ACTIVE ACTIVITIES AND ADLS

## 2020-02-19 ASSESSMENT — PAIN DESCRIPTION - PAIN TYPE
TYPE: SURGICAL PAIN
TYPE: SURGICAL PAIN;CHRONIC PAIN
TYPE: SURGICAL PAIN

## 2020-02-19 ASSESSMENT — PAIN DESCRIPTION - FREQUENCY
FREQUENCY: CONTINUOUS

## 2020-02-19 ASSESSMENT — PAIN DESCRIPTION - PROGRESSION
CLINICAL_PROGRESSION: NOT CHANGED
CLINICAL_PROGRESSION: GRADUALLY IMPROVING
CLINICAL_PROGRESSION: NOT CHANGED
CLINICAL_PROGRESSION: NOT CHANGED

## 2020-02-19 ASSESSMENT — PAIN DESCRIPTION - DESCRIPTORS
DESCRIPTORS: DISCOMFORT;ACHING;BURNING
DESCRIPTORS: ACHING;BURNING;DISCOMFORT
DESCRIPTORS: DISCOMFORT;ACHING;BURNING
DESCRIPTORS: DULL
DESCRIPTORS: ACHING;BURNING;DISCOMFORT
DESCRIPTORS: DISCOMFORT
DESCRIPTORS: ACHING;BURNING

## 2020-02-19 ASSESSMENT — PAIN SCALES - GENERAL
PAINLEVEL_OUTOF10: 6
PAINLEVEL_OUTOF10: 5
PAINLEVEL_OUTOF10: 6
PAINLEVEL_OUTOF10: 7
PAINLEVEL_OUTOF10: 7
PAINLEVEL_OUTOF10: 5

## 2020-02-19 ASSESSMENT — PAIN DESCRIPTION - LOCATION
LOCATION: BACK

## 2020-02-19 ASSESSMENT — PAIN SCALES - WONG BAKER: WONGBAKER_NUMERICALRESPONSE: 0

## 2020-02-19 ASSESSMENT — PAIN DESCRIPTION - ONSET
ONSET: ON-GOING

## 2020-02-19 NOTE — ANESTHESIA POSTPROCEDURE EVALUATION
Department of Anesthesiology  Postprocedure Note    Patient: Odell Reynoso  MRN: [id number]  YOB: 1960  Date of evaluation: 2/19/2020  Time:  1:27 PM     Procedure Summary     Date:  02/19/20 Room / Location:  [address] [place] [address],[facility] Floor / [address] [place] [address]    Anesthesia Start:  6800 Anesthesia Stop:  3806    Procedure:  FACETECTOMY L4-5 RIGHT, TRANSFORAMINAL LUMBAR INTERBODY AND FUSION WITH INTERBODY IMPLANT WITH RODS AND SCREW FIXATION L4-5 WITH C-ARM (Right Back) Diagnosis:  (LUMBAR STENOSIS, LUMBAR DISC HERNIATION)    Surgeon:  Florencia Garcia MD Responsible Provider:  Emery Gao MD    Anesthesia Type:  general ASA Status:  3          Anesthesia Type: general    Nelli Phase I: Nelli Score: 8    Nelli Phase II:      Last vitals: Reviewed and per EMR flowsheets.        Anesthesia Post Evaluation    Patient location during evaluation: bedside  Patient participation: complete - patient participated  Level of consciousness: awake  Pain score: 3  Airway patency: patent  Nausea & Vomiting: no nausea and no vomiting  Complications: no  Cardiovascular status: blood pressure returned to baseline  Respiratory status: acceptable  Hydration status: euvolemic

## 2020-02-19 NOTE — PROGRESS NOTES
Patient is having tremors and states feeling anxious. This RN called Dr. Andie Mathur to inform MD that patient is anxious and having tremors. Telephone order received from Dr. Andie Mathur MD for valium 5mg, PO q8hrs PRN. Will continue to monitor patient per unit protocols.  Electronically signed by Luz Maria Hoang RN on 2/19/2020 at 5:42 PM

## 2020-02-19 NOTE — ANESTHESIA PRE PROCEDURE
[facility] Department of Anesthesiology  Pre-Anesthesia Evaluation/Consultation       Name:  Dominga Car  : 1960  Age:  63 y.o. MRN:  [id number]  Date: 2020           Surgeon: Surgeon(s):  Abe Leung MD    Procedure: Procedure(s):  FACETECTOMY L4-5 RIGHT, TRANSFORAMINAL LUMBAR INTERBODY AND FUSION WITH INTERBODY IMPLANT WITH RODS AND SCREW FIXATION L4-5 WITH C-ARM     Allergies   Allergen Reactions    Bactrim [Sulfamethoxazole-Trimethoprim] Shortness Of Breath     Pt is extremely allergic to this medication, can cause seizures. Patient Active Problem List   Diagnosis    Cigarette smoker    COPD (chronic obstructive pulmonary disease) ([facility] [address]    Erectile dysfunction    Rash of hands     Past Medical History:   Diagnosis Date    Arthritis     eft hand and bilateral knee    COPD (chronic obstructive pulmonary disease) ([facility] [address] 2014    Depression     Erectile dysfunction 2014    Seizure ([facility] [address] 2018    allergic reaction to bactrim    Wears glasses      Past Surgical History:   Procedure Laterality Date    COLECTOMY  2018    partial    HERNIA REPAIR      umbilical     Social History     Tobacco Use    Smoking status: Former Smoker     Packs/day: 2.00     Years: 40.00     Pack years: 80.00     Types: Cigarettes     Start date: 1965     Last attempt to quit: 2020     Years since quittin.0    Smokeless tobacco: Never Used   Substance Use Topics    Alcohol use: Yes     Alcohol/week: 35.0 standard drinks     Types: 35 Cans of beer per week    Drug use: Never     Medications  No current facility-administered medications on file prior to encounter.       Current Outpatient Medications on File Prior to Encounter   Medication Sig Dispense Refill    methocarbamol (ROBAXIN) 500 MG tablet Take 500 mg by mouth daily       Current Facility-Administered Medications   Medication Dose Route Frequency Provider Last Rate Last Dose Component Value Date     02/14/2020    K 4.3 02/14/2020    CL 98 02/14/2020    CO2 28 02/14/2020    BUN 7 02/14/2020    CREATININE 0.5 02/14/2020    GFRAA >60 02/14/2020    LABGLOM >60 02/14/2020    GLUCOSE 88 02/14/2020    PROT 5.8 02/04/2018    CALCIUM 9.1 02/14/2020    BILITOT 0.3 02/04/2018    ALKPHOS 303 02/04/2018     02/04/2018     02/04/2018     BMP    Lab Results   Component Value Date     02/14/2020    K 4.3 02/14/2020    CL 98 02/14/2020    CO2 28 02/14/2020    BUN 7 02/14/2020    CREATININE 0.5 02/14/2020    CALCIUM 9.1 02/14/2020    GFRAA >60 02/14/2020    LABGLOM >60 02/14/2020    GLUCOSE 88 02/14/2020     POCGlucose  No results for input(s): GLUCOSE in the last 72 hours.    Coags    Lab Results   Component Value Date    PROTIME 10.0 02/14/2020    INR 0.86 02/14/2020    APTT 33.0 97/86/2546     HCG (If Applicable) No results found for: PREGTESTUR, PREGSERUM, HCG, HCGQUANT   ABGs No results found for: PHART, PO2ART, BWL7LLW, SRP3VPD, BEART, D2AAPUIT   Type & Screen (If Applicable)  No results found for: LABABO, LABRH                         BMI: Wt Readings from Last 3 Encounters:       NPO Status:   Date of last liquid consumption: 02/18/20   Time of last liquid consumption: 2100   Date of last solid food consumption: 02/18/20      Time of last solid consumption: 2100       Anesthesia Evaluation  Patient summary reviewed no history of anesthetic complications:   Airway: Mallampati: II       Mouth opening: > = 3 FB Dental: normal exam         Pulmonary:   (+) COPD:                             Cardiovascular:        (-) pacemaker, hypertension and valvular problems/murmurs                Neuro/Psych:   (+) seizures:, neuromuscular disease:, psychiatric history:   (-) CVA           GI/Hepatic/Renal:        (-) liver disease, no renal disease, bowel prep and no morbid obesity       Endo/Other:    (+) : arthritis: OA., .    (-) diabetes mellitus               Abdominal: Vascular: negative vascular ROS. Anesthesia Plan      general     ASA 3     (Neuromonitoring )  Induction: intravenous. MIPS: Postoperative opioids intended and Prophylactic antiemetics administered. Anesthetic plan and risks discussed with patient. Plan discussed with CRNA. Attending anesthesiologist reviewed and agrees with Pre Eval content              This pre-anesthesia assessment may be used as a history and physical.    DOS STAFF ADDENDUM:    Pt seen and examined, chart reviewed (including anesthesia, drug and allergy history). No interval changes to history and physical examination. Anesthetic plan, risks, benefits, alternatives, and personnel involved discussed with patient. Patient verbalized an understanding and agrees to proceed.       Lord Miranda MD  February 19, 2020  6:40 AM

## 2020-02-19 NOTE — PROGRESS NOTES
4 Eyes Skin Assessment     The patient is being assess for  Post-Op Surgical    I agree that 2 RN's have performed a thorough Head to Toe Skin Assessment on the patient. ALL assessment sites listed below have been assessed. Areas assessed by both nurses: Milan Ady and chani  [x]   Head, Face, and Ears   [x]   Shoulders, Back, and Chest  [x]   Arms, Elbows, and Hands   [x]   Coccyx, Sacrum, and IschIum  [x]   Legs, Feet, and Heels        Does the Patient have Skin Breakdown?   No         Hudson Prevention initiated:  No   Wound Care Orders initiated:  No      WOC nurse consulted for Pressure Injury (Stage 3,4, Unstageable, DTI, NWPT, and Complex wounds), New and Established Ostomies:  No      Nurse 1 eSignature: Electronically signed by Emmett Arthur RN on 2/19/20 at 3:53 PM    **SHARE this note so that the co-signing nurse is able to place an eSignature**    Nurse 2 eSignature: Electronically signed by Nayely Lima RN on 2/19/20 at 3:55 PM

## 2020-02-19 NOTE — PROGRESS NOTES
Pacu. Pt awake, alert. Denies numbness/tingling in BLE. Able to move BLE appropriately. Incision cdi. VSS.

## 2020-02-20 PROCEDURE — 2580000003 HC RX 258: Performed by: NEUROLOGICAL SURGERY

## 2020-02-20 PROCEDURE — 97116 GAIT TRAINING THERAPY: CPT

## 2020-02-20 PROCEDURE — 97166 OT EVAL MOD COMPLEX 45 MIN: CPT

## 2020-02-20 PROCEDURE — 94660 CPAP INITIATION&MGMT: CPT

## 2020-02-20 PROCEDURE — 97530 THERAPEUTIC ACTIVITIES: CPT

## 2020-02-20 PROCEDURE — 97162 PT EVAL MOD COMPLEX 30 MIN: CPT

## 2020-02-20 PROCEDURE — 6360000002 HC RX W HCPCS: Performed by: NEUROLOGICAL SURGERY

## 2020-02-20 PROCEDURE — 1200000000 HC SEMI PRIVATE

## 2020-02-20 PROCEDURE — 6370000000 HC RX 637 (ALT 250 FOR IP): Performed by: NEUROLOGICAL SURGERY

## 2020-02-20 PROCEDURE — 94760 N-INVAS EAR/PLS OXIMETRY 1: CPT

## 2020-02-20 RX ORDER — OXYCODONE HYDROCHLORIDE 5 MG/1
5-10 TABLET ORAL EVERY 4 HOURS PRN
Qty: 50 TABLET | Refills: 0 | Status: SHIPPED | OUTPATIENT
Start: 2020-02-20 | End: 2020-02-27

## 2020-02-20 RX ORDER — M-VIT,TX,IRON,MINS/CALC/FOLIC 27MG-0.4MG
1 TABLET ORAL DAILY
COMMUNITY

## 2020-02-20 RX ORDER — METHOCARBAMOL 500 MG/1
750 TABLET, FILM COATED ORAL 4 TIMES DAILY
Qty: 90 TABLET | Refills: 0 | Status: SHIPPED | OUTPATIENT
Start: 2020-02-20 | End: 2020-03-21

## 2020-02-20 RX ORDER — POTASSIUM GLUCONATE 595(99)MG
1 TABLET, EXTENDED RELEASE ORAL DAILY
COMMUNITY
End: 2020-07-28 | Stop reason: CLARIF

## 2020-02-20 RX ADMIN — OXYCODONE HYDROCHLORIDE 10 MG: 10 TABLET ORAL at 08:33

## 2020-02-20 RX ADMIN — SODIUM CHLORIDE: 9 INJECTION, SOLUTION INTRAVENOUS at 02:37

## 2020-02-20 RX ADMIN — OXYCODONE HYDROCHLORIDE 10 MG: 10 TABLET ORAL at 12:38

## 2020-02-20 RX ADMIN — FAMOTIDINE 20 MG: 20 TABLET, FILM COATED ORAL at 08:33

## 2020-02-20 RX ADMIN — METHOCARBAMOL TABLETS 750 MG: 750 TABLET, COATED ORAL at 21:15

## 2020-02-20 RX ADMIN — DIAZEPAM 5 MG: 5 TABLET ORAL at 02:37

## 2020-02-20 RX ADMIN — SODIUM CHLORIDE, PRESERVATIVE FREE 10 ML: 5 INJECTION INTRAVENOUS at 08:37

## 2020-02-20 RX ADMIN — METHOCARBAMOL TABLETS 750 MG: 750 TABLET, COATED ORAL at 17:10

## 2020-02-20 RX ADMIN — FAMOTIDINE 20 MG: 20 TABLET, FILM COATED ORAL at 21:15

## 2020-02-20 RX ADMIN — METHOCARBAMOL TABLETS 750 MG: 750 TABLET, COATED ORAL at 08:33

## 2020-02-20 RX ADMIN — VANCOMYCIN HYDROCHLORIDE 750 MG: 750 INJECTION, POWDER, LYOPHILIZED, FOR SOLUTION INTRAVENOUS at 02:37

## 2020-02-20 RX ADMIN — OXYCODONE HYDROCHLORIDE 10 MG: 10 TABLET ORAL at 02:37

## 2020-02-20 RX ADMIN — SODIUM CHLORIDE, PRESERVATIVE FREE 10 ML: 5 INJECTION INTRAVENOUS at 21:16

## 2020-02-20 RX ADMIN — METHOCARBAMOL TABLETS 750 MG: 750 TABLET, COATED ORAL at 13:45

## 2020-02-20 RX ADMIN — OXYCODONE HYDROCHLORIDE 10 MG: 10 TABLET ORAL at 21:16

## 2020-02-20 RX ADMIN — VANCOMYCIN HYDROCHLORIDE 750 MG: 750 INJECTION, POWDER, LYOPHILIZED, FOR SOLUTION INTRAVENOUS at 14:34

## 2020-02-20 ASSESSMENT — PAIN DESCRIPTION - FREQUENCY
FREQUENCY: CONTINUOUS

## 2020-02-20 ASSESSMENT — PAIN DESCRIPTION - ONSET
ONSET: ON-GOING
ONSET: GRADUAL
ONSET: ON-GOING

## 2020-02-20 ASSESSMENT — PAIN DESCRIPTION - DESCRIPTORS
DESCRIPTORS: ACHING;BURNING
DESCRIPTORS: ACHING;BURNING
DESCRIPTORS: ACHING
DESCRIPTORS: ACHING;BURNING
DESCRIPTORS: ACHING;BURNING
DESCRIPTORS: ACHING;BURNING;DISCOMFORT
DESCRIPTORS: ACHING;BURNING;DISCOMFORT
DESCRIPTORS: ACHING

## 2020-02-20 ASSESSMENT — PAIN DESCRIPTION - LOCATION
LOCATION: BACK

## 2020-02-20 ASSESSMENT — PAIN - FUNCTIONAL ASSESSMENT
PAIN_FUNCTIONAL_ASSESSMENT: PREVENTS OR INTERFERES SOME ACTIVE ACTIVITIES AND ADLS

## 2020-02-20 ASSESSMENT — PAIN DESCRIPTION - PAIN TYPE
TYPE: SURGICAL PAIN

## 2020-02-20 ASSESSMENT — PAIN SCALES - GENERAL
PAINLEVEL_OUTOF10: 7
PAINLEVEL_OUTOF10: 6
PAINLEVEL_OUTOF10: 7
PAINLEVEL_OUTOF10: 5
PAINLEVEL_OUTOF10: 7
PAINLEVEL_OUTOF10: 5
PAINLEVEL_OUTOF10: 5

## 2020-02-20 ASSESSMENT — PAIN DESCRIPTION - ORIENTATION
ORIENTATION: LOWER

## 2020-02-20 ASSESSMENT — PAIN SCALES - WONG BAKER
WONGBAKER_NUMERICALRESPONSE: 0

## 2020-02-20 ASSESSMENT — PAIN DESCRIPTION - PROGRESSION
CLINICAL_PROGRESSION: NOT CHANGED
CLINICAL_PROGRESSION: NOT CHANGED
CLINICAL_PROGRESSION: GRADUALLY IMPROVING
CLINICAL_PROGRESSION: NOT CHANGED
CLINICAL_PROGRESSION: GRADUALLY WORSENING

## 2020-02-20 NOTE — OP NOTE
[address] [facility]                                OPERATIVE REPORT    PATIENT NAME: Maggie Burns                   :        1960  MED REC NO:   [id number]                          ROOM:       3119  ACCOUNT NO:   [de-identified]                           ADMIT DATE: 2020  PROVIDER:     Selvin Crump MD      DATE OF PROCEDURE:  2020    PREOPERATIVE DIAGNOSES:  Lumbar stenosis, neurogenic claudication,  radiculopathy, lumbar disc herniation. POSTOPERATIVE DIAGNOSES:  Lumbar stenosis, neurogenic claudication,  radiculopathy, lumbar disc herniation. PROCEDURE PERFORMED:  1. Stereotactic navigation placement of pedicle screws bilateral L4,  L5.  2.  Lumbar laminectomy L4, L5.  3.  Facetectomy rightward L4-5.  4.  Transforaminal lumbar interbody fusion, rightward L4-5 using PEEK  interbody cage with i-FACTOR. 5.  Posterior instrumentation, fusion L4-5 using RTI pedicle screws and  rods. 6.  Posterolateral fusion bilateral L4-5 using morcellated autograft. 7.  Continuous EMG NIM monitoring. 8.  Use of microscope. 9.  Physician-directed and interpreted intraoperative fluoroscopy. SURGEON:  Selvin Crump MD    ANESTHESIA:  General endotracheal.    COMPLICATIONS:  None apparent. INDICATIONS:  The patient is a 61-year-old with back pain, neurogenic  claudication and right-sided radiculopathy who failed conservative  treatments. MRI demonstrated acquired canal stenosis, severe L4-5 with  disc bulge. He elected to undergo decompression and stabilization. PROCEDURE:  After obtaining informed consent, he was taken to the  operating suite on 2020. General endotracheal intubation was  initiated. He was positioned prone onto a Christ table with care taken  to pad pressure points. Antibiotics were administered. Level was  localized intraoperatively with fluoroscopy.   A midline lumbar incision  was planned. He was prepped and draped in typical surgical fashion. Skin was infiltrated with Marcaine and epinephrine. Scalpel was used to  incise the skin. Bovie was used to dissect subcutaneous tissues to  thoracolumbar fascia. Thoracolumbar fascia was incised midline with  Bovie and bilateral subperiosteal dissections were performed to expose  the posterior elements of L3-S1 bilaterally. Self-retaining retractors  were placed and the patient's spine was registered to stealth  navigation. Using stealth navigation, 6.5 x 50 mm pedicle screws were placed  bilaterally at L4 and L5 in the following manner:  Stealth probe was  used to localize pedicle over facet, handheld drill under fluoroscopy  was used to drill a  hole, stealth directed tap was advanced  through the pedicle into the vertebral body, screw length and diameter  was measured and was delivered to the field, channel was palpated with a  Spring probe and the screw was advanced through the pedicle into the  vertebral body under NIM monitoring. After placement of screws, they  were interrogated intraoperatively and found to be within threshold  values. The microscope was delivered. Under the microscope, a  laminectomy of L4 and L5 was performed bilateral and complete. Thecal  sac was decompressed. Medial facetectomy was performed leftward at L4-5  and a facetectomy was performed rightward at L4-5. Bulging annulus was  identified and epidural fat was coagulated. Nerve root retractor was  placed and a radical discectomy was performed of L4-5 rightward with  serial missy. Disc was delivered off the field as a specimen. An  8-mm PEEK interbody graft was prepared, packed with i-FACTOR and then  tamped into the L4-5 space from the right-sided approach completing  arthrodesis at this level. Fluoroscopic images were obtained to confirm position of the graft. The  microscope was taken out.   The incision was irrigated. Prebent lordotic  rods were then selected and secured over L4 and L5 bilaterally. These  were set with set screws and torqued with a torquing device completing  posterior instrumentation, fusion at this level. Lateral edges of bone  were decorticated with a drill and morcellated autograft was packed  along the lateral elements and hardware of L4-5, completing  posterolateral fusion at this level. Retractors were removed and final  fluoroscopic images were obtained. The incision was closed in multiple  layers. Skin was closed. Dressing was applied. The patient was  extubated and transferred to recovery in stable condition. There were  no apparent complications. All counts were correct. A time-out  procedure was performed prior to incision. I was present for the entire  procedure. Estimated blood loss 50 mL.         Ria Kelley MD    D: 02/19/2020 10:54:28       T: 02/19/2020 11:02:21     [email or web address]  Job#: [id number]     Doc#: [id number]    CC:

## 2020-02-20 NOTE — PROGRESS NOTES
Occupational Therapy   Occupational Therapy Initial Assessment  Date: 2020   Patient Name: Shay Schmidt  MRN: [id number]     : 1960    Date of Service: 2020    Assessment: Pt is 63 y.o. M who presents with lumbar stenosis with neurogenic claudication. Pt is s/p facetectomy L4-5 on R, transforaminal lumbar interbody and fusion with interbody implant with rods and screw fixation L4-5. PTA pt lives with wife in two story home with 3 SELIN. Pt reports independence in self-care tasks, wife assists with homemaking responsibilities, and functional mobility with no device. Currently, pt presents with ROM/strength in [facility] for self-care and transfers. Pt completed bed mobility with SPV and sit <> stand transfers with SBA. Pt completed functional mobility with RW with CGA, somewhat unsteady. Pt able to don LSO without assist. Anticipate pt to require min A for ADL needs at this time. Anticipate pt safe to d/c home with initial 24/7 supervision/assist. No further OT needs warranted at this time - will continue to follow during acute care stay to increase strength and activity tolerance for discharge home. Discharge Recommendations:  24 hour supervision or assist       Assessment   Performance deficits / Impairments: Decreased functional mobility ; Decreased strength;Decreased endurance;Decreased ADL status; Decreased balance  Assessment: Pt is 63 y.o. M who presents with lumbar stenosis with neurogenic claudication. Pt is s/p facetectomy L4-5 on R, transforaminal lumbar interbody and fusion with interbody implant with rods and screw fixation L4-5. PTA pt lives with wife in two story home with 3 SELIN. Pt reports independence in self-care tasks, wife assists with homemaking responsibilities, and functional mobility with no device. Currently, pt presents with ROM/strength in [facility] for self-care and transfers. Pt completed bed mobility with SPV and sit <> stand transfers with SBA.  Pt completed functional assistance  Functional Mobility Comments: Pt completed functional mobility with RW with CGA, somewhat unsteady and tremulus. Cues for RW management. ADL  UE Dressing: (Pt donned LSO without assist - minimal cues )  Additional Comments: PTA pt reports independence in self-care tasks. Anticipate pt to require min A for LB ADLs, SBA for UB ADLs, CGA for toileting. Pt declined further ADL needs at this time. Tone RUE  RUE Tone: Normotonic  Tone LUE  LUE Tone: Normotonic  Coordination  Movements Are Fluid And Coordinated: Yes  Quality of Movement Other  Comment: Somewhat tremulus     Bed mobility  Supine to Sit: Supervision(HOB flat, use of hand rail - good log roll technique )  Sit to Supine: Supervision(Flat hospital bed, log roll technique )     Transfers  Sit to stand: Stand by assistance  Stand to sit: Stand by assistance  Transfer Comments: SBA for sit <> stand from EOB to RW and sit back to EOB     Cognition  Overall Cognitive Status: WFL        Sensation  Overall Sensation Status: WFL        LUE AROM (degrees)  LUE AROM : WFL  Left Hand AROM (degrees)  Left Hand AROM: WFL  RUE AROM (degrees)  RUE AROM : WFL  Right Hand AROM (degrees)  Right Hand AROM: WFL     LUE Strength  Gross LUE Strength: WFL  RUE Strength  Gross RUE Strength: WFL          Plan   Plan  Times per week: 3-5  Current Treatment Recommendations: Strengthening, Functional Mobility Training, Endurance Training, Balance Training, Safety Education & Training, Equipment Evaluation, Education, & procurement, Patient/Caregiver Education & Training, Self-Care / ADL      AM-PAC Score   Continue to assess for needs at d/c - anticipate no further OT needs.       AM-PAC Inpatient Daily Activity Raw Score: 18 (02/20/20 1158)  AM-PAC Inpatient ADL T-Scale Score : 38.66 (02/20/20 1158)  ADL Inpatient CMS 0-100% Score: 46.65 (02/20/20 1158)  ADL Inpatient CMS G-Code Modifier : CK (02/20/20 1158)    Goals  Short term goals  Time Frame for Short term goals:

## 2020-02-20 NOTE — PLAN OF CARE
Problem: Falls - Risk of:  Goal: Will remain free from falls  Description  Will remain free from falls  2/19/2020 2359 by Cheyanne Moses RN  Outcome: Ongoing  Note:   Fall risk assessment completed . Fall precautions in place, bed/ chair alarm on, side rails 2/4 up, call light in reach, educated pt on calling for assistance when needed, room clear of clutter. Pt verbalized understanding. 2/19/2020 1432 by Eden Garza RN  Outcome: Ongoing  Goal: Absence of physical injury  Description  Absence of physical injury  2/19/2020 2359 by Cheyanne Moses RN  Outcome: Ongoing  Note:   Pt is free of injury. No injury noted. Fall precautions in place. Call light within reach. Will monitor. 2/19/2020 1432 by Eden Garza RN  Outcome: Ongoing     Problem: Pain:  Goal: Pain level will decrease  Description  Pain level will decrease  2/19/2020 2359 by Cheyanne Moses RN  Outcome: Ongoing  Note:   Pain/discomfort being managed with PRN analgesics per MD orders. Pt able to express presence and absence of pain and rate pain appropriately using numerical scale. 2/19/2020 1432 by Eden Garza RN  Outcome: Ongoing  Goal: Control of acute pain  Description  Control of acute pain  2/19/2020 2359 by Cheyanne Moses RN  Outcome: Ongoing  Note:   Patient educated on acute pain. Taught patient to use call light to ask for pain medication. PRN pain medication given for acute pain. Will continue to monitor pain per unit protocol. 2/19/2020 1432 by Eden Garza RN  Outcome: Ongoing  Goal: Control of chronic pain  Description  Control of chronic pain  2/19/2020 2359 by Cheyanne Moses RN  Outcome: Ongoing  Note:   Patient educated on chronic pain. Taught patient to use call light to ask for pain medication. PRN pain medication given for chronic pain. Will continue to monitor pain per unit protocol.     2/19/2020 1432 by Eden Garza RN  Outcome: Ongoing

## 2020-02-20 NOTE — PROGRESS NOTES
Patient is A&O. Patient is resting in bed, awake and quiet. Room air. Side rails are up x2. Fall precautions are in place. Bed is in lowest position. Call light is in reach. Dressing to lower back is clean, dry and intact. VSS taken. AM meds given. Shift assessment completed. Will continue to monitor patient per unit protocols.  Electronically signed by Adelaida Vargas RN on 2/20/2020 at 9:29 AM

## 2020-02-20 NOTE — PROGRESS NOTES
Patient has ambulated in [place] 180ft using rolling walker with staff nurse. Patient tolerated well. Will continue to monitor patient per unit protocols.  Electronically signed by Adam Tesfaye RN on 2/20/2020 at 10:22 AM

## 2020-02-20 NOTE — PROGRESS NOTES
Medication Reconciliation Reviewed/Updated    List of medications patient is currently taking is complete.     Sergio Orellana PharmD, BCPS  2/20/2020 11:24 AM

## 2020-02-20 NOTE — DISCHARGE INSTR - COC
Continuity of Care Form    Patient Name: Jyoti Chaves   :  1960  MRN:  [id number]    Admit date:  2020  Discharge date:  ***    Code Status Order: Full Code   Advance Directives:   Advance Care Flowsheet Documentation     Date/Time Healthcare Directive Type of Healthcare Directive Copy in [address] Agent's Name Healthcare Agent's Phone Number    20 1416  No, patient does not have an advance directive for healthcare treatment -- -- -- -- --    20 1636  No, patient does not have an advance directive for healthcare treatment -- -- -- -- --          Admitting Physician:  Morgan Jain MD  PCP: Radha Zhou MD    Discharging Nurse: [facility] Unit/Room#: S6D-6090/3119-01  Discharging Unit Phone Number: ***    Emergency Contact:   Extended Emergency Contact Information  Primary Emergency Contact: Schoeny,Debbie  Address: [address] [place] [address] Phone: [phone]  Relation: Spouse  Secondary Emergency Contact: Shantell Campos  Home Phone: [phone]  Mobile Phone: [phone]  Relation: Brother/Sister    Past Surgical History:  Past Surgical History:   Procedure Laterality Date    COLECTOMY  2018    partial    HERNIA REPAIR      umbilical    LUMBAR FUSION Right 2020    FACETECTOMY L4-5 RIGHT, TRANSFORAMINAL LUMBAR INTERBODY AND FUSION WITH INTERBODY IMPLANT WITH RODS AND SCREW FIXATION L4-5 WITH C-ARM performed by Morgan Jain MD at [place] [address]       Immunization History: There is no immunization history on file for this patient.     Active Problems:  Patient Active Problem List   Diagnosis Code    Cigarette smoker F17.210    COPD (chronic obstructive pulmonary disease) ([facility] [address] J44.9    Erectile dysfunction N52.9    Rash of hands R21    Lumbar stenosis with neurogenic claudication M48.062       Isolation/Infection:   Isolation          No Isolation oxygen:10894}  Ventilator:    { CC Vent MQIU:836155371}    Rehab Therapies: {THERAPEUTIC INTERVENTION:5141106270}  Weight Bearing Status/Restrictions: { CC Weight Bearin}  Other Medical Equipment (for information only, NOT a DME order):  {EQUIPMENT:895972165}  Other Treatments: ***    Patient's personal belongings (please select all that are sent with patient):  {CHP DME Belongings:917506000}    RN SIGNATURE:  {Esignature:970676886}    CASE MANAGEMENT/SOCIAL WORK SECTION    Inpatient Status Date: ***    Readmission Risk Assessment Score:  Readmission Risk              Risk of Unplanned Readmission:        6           Discharging to Facility/ Agency   · Name:   · Address:  · Phone:  · Fax:    Dialysis Facility (if applicable)   · Name:  · Address:  · Dialysis Schedule:  · Phone:  · Fax:    / signature: {Esignature:004050021}    PHYSICIAN SECTION    Prognosis: {Prognosis:2311941719}    Condition at Discharge: [address] [place] [address] Patient Condition:270322443}    Rehab Potential (if transferring to Rehab): {Prognosis:4490799510}    Recommended Labs or Other Treatments After Discharge: ***    Physician Certification: I certify the above information and transfer of John Shultz  is necessary for the continuing treatment of the diagnosis listed and that he requires {Admit to Appropriate Level of Care:23279} for {GREATER/LESS:498392191} 30 days.      Update Admission H&P: {CHP DME Changes in BVWBM:922323333}    PHYSICIAN SIGNATURE:  Electronically signed by Nancy Cabrera MD on 20 at 1:33 PM

## 2020-02-20 NOTE — PROGRESS NOTES
Physical Therapy    Facility/Department: [address] ORTHOPEDICS  Initial Assessment    NAME: Rika Neil  : 1960  MRN: [id number]    Date of Service: 2020    Assessment / Discharge Recommendations:  -good start with initial efforts to ambulate in room and hallway  -he is able to don and doff TLSO with supervision  -anticipate home with family assist when medically ready    Activity limitations: Decreased functional mobility ; Decreased ADL status  Prognosis: Good  Decision Making: Medium Complexity  REQUIRES PT FOLLOW UP: Yes  Activity Tolerance  Activity Tolerance: Patient Tolerated treatment well       Patient Diagnosis(es): There were no encounter diagnoses. has a past medical history of Arthritis, COPD (chronic obstructive pulmonary disease) ([facility] [address] Depression, Erectile dysfunction, Seizure ([facility] [address] and Wears glasses. has a past surgical history that includes hernia repair; colectomy (2018); and lumbar fusion (Right, 2020). Restrictions  Restrictions/Precautions  Restrictions/Precautions: Fall Risk  Position Activity Restriction  Spinal Precautions: No Bending, No Lifting, No Twisting  Other position/activity restrictions: TLSO on when OOB   Vision/Hearing  Vision: Within Functional Limits  Hearing: Within functional limits     Subjective  General  Chart Reviewed: Yes  Patient assessed for rehabilitation services?: Yes  Additional Pertinent Hx: here for elective lumbar fusion  Response To Previous Treatment: Not applicable  Family / Caregiver Present: Yes(wife)  Follows Commands: Within Functional Limits  Subjective  Subjective: arrived to room along with OT- patient is resting in bed in right sidelying- agreeable to PTOT assessments   Pain Screening  Patient Currently in Pain: (Pt reporting pain 4/10 in back  Simultaneous filing.  User may not have seen previous data.)  Orientation  Orientation  Overall Orientation Status: Within Functional Limits  Social/Functional

## 2020-02-20 NOTE — PLAN OF CARE
Problem: Falls - Risk of:  Goal: Will remain free from falls  Description  Will remain free from falls  2/20/2020 0000 by Sina Reynolds RN  Outcome: Ongoing  Note:   Fall risk assessment completed . Fall precautions in place, bed/ chair alarm on, side rails 2/4 up, call light in reach, educated pt on calling for assistance when needed, room clear of clutter. Pt verbalized understanding. 2/19/2020 2359 by Sina Reynolds RN  Outcome: Ongoing  Note:   Fall risk assessment completed . Fall precautions in place, bed/ chair alarm on, side rails 2/4 up, call light in reach, educated pt on calling for assistance when needed, room clear of clutter. Pt verbalized understanding. 2/19/2020 1432 by Manjula Cagle RN  Outcome: Ongoing  Goal: Absence of physical injury  Description  Absence of physical injury  2/20/2020 0000 by Sina Reynolds RN  Outcome: Ongoing  Note:   Pt is free of injury. No injury noted. Fall precautions in place. Call light within reach. Will monitor. 2/19/2020 2359 by Sina Reynolds RN  Outcome: Ongoing  Note:   Pt is free of injury. No injury noted. Fall precautions in place. Call light within reach. Will monitor. 2/19/2020 1432 by Manjula Cagle RN  Outcome: Ongoing     Problem: Pain:  Goal: Pain level will decrease  Description  Pain level will decrease  2/20/2020 0000 by Sina Reynolds RN  Outcome: Ongoing  Note:   Pain/discomfort being managed with PRN analgesics per MD orders. Pt able to express presence and absence of pain and rate pain appropriately using numerical scale. 2/19/2020 2359 by Sina Reynolds RN  Outcome: Ongoing  Note:   Pain/discomfort being managed with PRN analgesics per MD orders. Pt able to express presence and absence of pain and rate pain appropriately using numerical scale.    2/19/2020 1432 by Manjula Cagle RN  Outcome: Ongoing  Goal: Control of acute pain  Description  Control of acute pain  2/20/2020 0000 by Gayla Pat RAMON Tiwari  Outcome: Ongoing  Note:   Patient educated on acute pain. Taught patient to use call light to ask for pain medication. PRN pain medication given for acute pain. Will continue to monitor pain per unit protocol. 2/19/2020 2359 by Dalia Will RN  Outcome: Ongoing  Note:   Patient educated on acute pain. Taught patient to use call light to ask for pain medication. PRN pain medication given for acute pain. Will continue to monitor pain per unit protocol. 2/19/2020 1432 by Emmett Arthur RN  Outcome: Ongoing  Goal: Control of chronic pain  Description  Control of chronic pain  2/20/2020 0000 by Dalia Will RN  Outcome: Ongoing  Note:   Patient educated on chronic pain. Taught patient to use call light to ask for pain medication. PRN pain medication given for chronic pain. Will continue to monitor pain per unit protocol. 2/19/2020 2359 by Dalia Will RN  Outcome: Ongoing  Note:   Patient educated on chronic pain. Taught patient to use call light to ask for pain medication. PRN pain medication given for chronic pain. Will continue to monitor pain per unit protocol.     2/19/2020 1432 by Emmett Arthur RN  Outcome: Ongoing

## 2020-02-20 NOTE — DISCHARGE SUMMARY
Date of admission 2/19/2020  Date of discharge 2/21/2020    Diagnosis lumbar stenosis, neurogenic claudication    Procedure L45 TLIF    Patient was admitted and taken to the operating room on 2/19/202 and underwent a TLIF L45, tolerated the procedure without complication and after a period of observation was trnasferred to the floor. Patient had mild tremors prior to surgery and continued afterward. Patient admitted to daily alcohol. Patient received valium for muscle spasm. On POD 1 he was afebrile with stable vitals, tolerating po, uop good, incision cdi, strength 5/5, ambulating with PT, no report of radicular pain. He stayed another night for pain control and was discharged to home in stable condition on 2/21/2020, prescriptions written, diet and activity restrictions discussed, medications reconciled, followup appointments made. Answered his questions and in agreement.

## 2020-02-21 VITALS
BODY MASS INDEX: 17.96 KG/M2 | HEIGHT: 69 IN | HEART RATE: 98 BPM | DIASTOLIC BLOOD PRESSURE: 71 MMHG | TEMPERATURE: 98.9 F | OXYGEN SATURATION: 95 % | SYSTOLIC BLOOD PRESSURE: 109 MMHG | WEIGHT: 121.25 LBS | RESPIRATION RATE: 16 BRPM

## 2020-02-21 PROCEDURE — 94760 N-INVAS EAR/PLS OXIMETRY 1: CPT

## 2020-02-21 PROCEDURE — 2580000003 HC RX 258: Performed by: NEUROLOGICAL SURGERY

## 2020-02-21 PROCEDURE — 97535 SELF CARE MNGMENT TRAINING: CPT

## 2020-02-21 PROCEDURE — 97530 THERAPEUTIC ACTIVITIES: CPT

## 2020-02-21 PROCEDURE — 6370000000 HC RX 637 (ALT 250 FOR IP): Performed by: NEUROLOGICAL SURGERY

## 2020-02-21 PROCEDURE — 6360000002 HC RX W HCPCS: Performed by: NEUROLOGICAL SURGERY

## 2020-02-21 RX ADMIN — FAMOTIDINE 20 MG: 20 TABLET, FILM COATED ORAL at 10:35

## 2020-02-21 RX ADMIN — DIAZEPAM 5 MG: 5 TABLET ORAL at 00:59

## 2020-02-21 RX ADMIN — SODIUM CHLORIDE, PRESERVATIVE FREE 10 ML: 5 INJECTION INTRAVENOUS at 10:36

## 2020-02-21 RX ADMIN — OXYCODONE HYDROCHLORIDE 10 MG: 10 TABLET ORAL at 10:00

## 2020-02-21 RX ADMIN — VANCOMYCIN HYDROCHLORIDE 750 MG: 750 INJECTION, POWDER, LYOPHILIZED, FOR SOLUTION INTRAVENOUS at 02:41

## 2020-02-21 RX ADMIN — HYDROMORPHONE HYDROCHLORIDE 0.5 MG: 1 INJECTION, SOLUTION INTRAMUSCULAR; INTRAVENOUS; SUBCUTANEOUS at 00:58

## 2020-02-21 RX ADMIN — METHOCARBAMOL TABLETS 750 MG: 750 TABLET, COATED ORAL at 10:35

## 2020-02-21 ASSESSMENT — PAIN DESCRIPTION - ORIENTATION
ORIENTATION: LOWER

## 2020-02-21 ASSESSMENT — PAIN DESCRIPTION - PAIN TYPE
TYPE: SURGICAL PAIN

## 2020-02-21 ASSESSMENT — PAIN - FUNCTIONAL ASSESSMENT
PAIN_FUNCTIONAL_ASSESSMENT: PREVENTS OR INTERFERES SOME ACTIVE ACTIVITIES AND ADLS

## 2020-02-21 ASSESSMENT — PAIN DESCRIPTION - LOCATION
LOCATION: BACK

## 2020-02-21 ASSESSMENT — PAIN DESCRIPTION - FREQUENCY
FREQUENCY: CONTINUOUS

## 2020-02-21 ASSESSMENT — PAIN DESCRIPTION - ONSET
ONSET: ON-GOING

## 2020-02-21 ASSESSMENT — PAIN SCALES - GENERAL
PAINLEVEL_OUTOF10: 4
PAINLEVEL_OUTOF10: 7
PAINLEVEL_OUTOF10: 7
PAINLEVEL_OUTOF10: 5

## 2020-02-21 ASSESSMENT — PAIN DESCRIPTION - DESCRIPTORS
DESCRIPTORS: ACHING

## 2020-02-21 ASSESSMENT — PAIN DESCRIPTION - PROGRESSION
CLINICAL_PROGRESSION: GRADUALLY IMPROVING
CLINICAL_PROGRESSION: GRADUALLY WORSENING
CLINICAL_PROGRESSION: GRADUALLY IMPROVING
CLINICAL_PROGRESSION: GRADUALLY WORSENING

## 2020-02-21 NOTE — PLAN OF CARE
Problem: Falls - Risk of:  Goal: Will remain free from falls  Description  Will remain free from falls  Outcome: Ongoing  Note:   Fall risk assessment completed every shift. All precautions in place. Pt has call light within reach at all times. Room clear of clutter. Pt aware to call for assistance when getting up. Problem: Falls - Risk of:  Goal: Absence of physical injury  Description  Absence of physical injury  Outcome: Ongoing  Note:   Fall risk assessment completed every shift. All precautions in place. Pt has call light within reach at all times. Room clear of clutter. Pt aware to call for assistance when getting up. Problem: Pain:  Goal: Pain level will decrease  Description  Pain level will decrease  Outcome: Ongoing  Note:   Pt educated to attempt non-phagological method of pain control, but it it becomes too strong use PRN analgesics. Pain and discomfort being managed PRN analgesics per MD orders. Pt able to express presence of pain. Problem: Pain:  Goal: Control of acute pain  Description  Control of acute pain  Outcome: Ongoing  Note:   Patient educated on acute pain. Taught patient to use call light to ask for pain medication. PRN pain medication given for acute pain. Will continue to monitor pain per unit protocol. Problem: Pain:  Goal: Control of chronic pain  Description  Control of chronic pain  Outcome: Ongoing  Note:   Patient educated on chronic pain. Taught patient to use call light to ask for pain medication. PRN pain medication given for chronic pain. Will continue to monitor pain per unit protocol.

## 2020-02-21 NOTE — PROGRESS NOTES
Patient and wife provided with supplies for daily dressing changes. Education provided to wife who reports competence with dressing changes.

## 2020-02-21 NOTE — PROGRESS NOTES
Occupational Therapy  Facility/Department: [address] ORTHOPEDICS  Daily Treatment Note  NAME: Fartun Verdin  : 1960  MRN: [id number]    Date of Service: 2020    Discharge Recommendations:  24 hour supervision or assist     Assessment: Discussed with OTR am pac score is 19. Anticipate that patient will be safe to return home with family to provide 24/7 supervision/assistance. Patient completed sit<>stand with SBA with cues for hand placement. Functional mobility with RW with SBA with cues for walker safety, no overt LOB noted. Min A for lower body dressing. Plan is for discharge to home today. Patient Diagnosis(es): The encounter diagnosis was Lumbar stenosis with neurogenic claudication. has a past medical history of Arthritis, COPD (chronic obstructive pulmonary disease) ([address] Depression, Erectile dysfunction, Seizure ([address] and Wears glasses. has a past surgical history that includes hernia repair; colectomy (2018); and lumbar fusion (Right, 2020). Restrictions  Restrictions/Precautions  Restrictions/Precautions: Fall Risk  Position Activity Restriction  Spinal Precautions: No Bending, No Lifting, No Twisting  Other position/activity restrictions: TLSO on when OOB   Subjective   General  Chart Reviewed: Yes  Patient assessed for rehabilitation services?: Yes  Additional Pertinent Hx: Pt is 63 y.o. M who presents with lumbar stenosis with neurogenic claudication. Pt is s/p facetectomy L4-5 on R, transforaminal lumbar interbody and fusion with interbody implant with rods and screw fixation L4-5. PMH: Seizures, depression, COPD   Response to previous treatment: Patient with no complaints from previous session  Family / Caregiver Present: No  Referring Practitioner: Raquel Valentin MD   Diagnosis: Lumbar stenosis with neurogenic claudication  Subjective  Subjective: Patient seated on edge of bed without alarm active and without non skid footwear. Patient agreeable to OT.  Patient reports pain in back. RN informed      Orientation  Orientation  Overall Orientation Status: Within Functional Limits  Objective    ADL  UE Dressing: Setup(min verbal cues for LSO, set up for t-shirt)  LE Dressing: Minimal assistance(Min A for shoes)        Balance  Sitting Balance: Supervision  Standing Balance: Stand by assistance  Standing Balance  Activity: Static standing with RW  Functional Mobility  Functional - Mobility Device: Rolling Walker  Activity: Other  Assist Level: Stand by assistance  Functional Mobility Comments: Functioanl mobility with RW with SBA, slow gait, less tremulus, no overt LOB noed, cues for walker safety     Transfers  Sit to stand: Stand by assistance(cues for hand placement )  Stand to sit: Stand by assistance(cues for hand placement )  Transfer Comments: Transfer to recliner chair with SBA with cues for walker safety and hand placement      Cognition  Overall Cognitive Status: [facility]     Assessment   Performance deficits / Impairments: Decreased functional mobility ; Decreased strength;Decreased endurance;Decreased ADL status; Decreased balance  Assessment: Discussed with OTR am pac score is 19. Anticipate that patient will be safe to return home with family to provide 24/7 supervision/assistance. Patient completed sit<>stand with SBA with cues for hand placement. Functional mobility with RW with SBA with cues for walker safety, no overt LOB noted. Min A for lower body dressing. Plan is for discharge to home today. OT Education: OT Role;ADL Adaptive Strategies; Plan of Care;Transfer Training;Precautions  REQUIRES OT FOLLOW UP: Yes  Activity Tolerance  Activity Tolerance: Patient Tolerated treatment well  Safety Devices  Safety Devices in place: Yes  Type of devices: Call light within reach; Chair alarm in place; Left in chair;Nurse notified     Plan   Plan  Times per week: 3-5  Current Treatment Recommendations: Strengthening, Functional Mobility Training, Endurance Training, Balance Training, Safety Education & Training, Equipment Evaluation, Education, & procurement, Patient/Caregiver Education & Training, Self-Care / ADL    AM-PAC Score        AM-PAC Inpatient Daily Activity Raw Score: 19 (02/21/20 1005)  AM-PAC Inpatient ADL T-Scale Score : 40.22 (02/21/20 1005)  ADL Inpatient CMS 0-100% Score: 42.8 (02/21/20 1005)  ADL Inpatient CMS G-Code Modifier : CK (02/21/20 1005)    Goals  Short term goals  Time Frame for Short term goals: prior to d/c: all goals ongoing   Short term goal 1: Pt will complete functional mobility and transfers with SPV  Short term goal 2: Pt will complete bathing with SPV  Short term goal 3: Pt will complete dressing with SPV  Short term goal 4: Pt will complete toileting with SPV  Short term goal 5: Pt will complete grooming in stance at sink with SPV  Patient Goals   Patient goals : \"get rid of pain and feel better\"       Therapy Time   Individual Concurrent Group Co-treatment   Time In 0920         Time Out 6257         Minutes 30                 Electronically signed by Sandra Barrow [id number] on 2/21/2020 at 10:14 AM    If discharged prior to next OT session please see last daily note for discharge status

## 2020-02-21 NOTE — PLAN OF CARE
Problem: Falls - Risk of:  Goal: Will remain free from falls  Description  Will remain free from falls  2/21/2020 1052 by Tyshawn Dean RN  Outcome: Ongoing  Note:   Patient remains free from falls during this shift. Fall precautions remain in place. Patient educated on the need to implement call light use prior to ambulation. Will continue to monitor and assess. Problem: Falls - Risk of:  Goal: Absence of physical injury  Description  Absence of physical injury  2/21/2020 1052 by Tyshawn Dean RN  Outcome: Ongoing  Note:   Patient remains free from physical harm during this shift. Will continue to monitor and assess patient. Problem: Pain:  Goal: Pain level will decrease  Description  Pain level will decrease  2/21/2020 1052 by Tyshawn Dean RN  Outcome: Ongoing  Note:   Pain managed with pharmacologic and non-pharmacologic interventions during this shift. Will continue to monitor and assess for needs and change in patient condition. Problem: Pain:  Goal: Control of acute pain  Description  Control of acute pain  2/21/2020 1052 by Tyshawn Dean RN  Outcome: Ongoing  Note:   Pain managed with pharmacologic and non-pharmacologic interventions during this shift. Will continue to monitor and assess for needs and change in patient condition. Problem: Pain:  Goal: Control of chronic pain  Description  Control of chronic pain  2/21/2020 1052 by Tyshawn Dean RN  Outcome: Ongoing  Note:   Pain managed with pharmacologic and non-pharmacologic interventions during this shift. Will continue to monitor and assess for needs and change in patient condition.

## 2020-02-21 NOTE — PROGRESS NOTES
Data- discharge order received, pt verbalized agreement to discharge, disposition to previous residence, no needs for HHC/DME. Action- discharge instructions prepared and given to patient and wife, pt verbalized understanding. Medication information packet given r/t NEW and/or CHANGED prescriptions emphasizing name/purpose/side effects, pt verbalized understanding. Discharge instruction summary: Diet- General, Activity- x1 with a walker, Primary Care Physician as follows: Miguel A Robbins -[phone] f/u appointment to be scheduled, immunizations reviewed, prescription medications filled with paper prescriptions provided to patient. Inpatient surgical procedure precautions reviewed and detailed in AVS.    Response- Pt belongings gathered, IV removed. Disposition is home (no HHC/DME needs), transported with wife, taken to lobby via w/c w/ spouse, no complications.

## 2020-02-24 ENCOUNTER — TELEPHONE (OUTPATIENT)
Dept: INTERNAL MEDICINE CLINIC | Age: 60
End: 2020-02-24

## 2020-02-27 ENCOUNTER — OFFICE VISIT (OUTPATIENT)
Dept: INTERNAL MEDICINE CLINIC | Age: 60
End: 2020-02-27
Payer: COMMERCIAL

## 2020-02-27 VITALS
SYSTOLIC BLOOD PRESSURE: 120 MMHG | BODY MASS INDEX: 19.26 KG/M2 | DIASTOLIC BLOOD PRESSURE: 62 MMHG | WEIGHT: 130.4 LBS | OXYGEN SATURATION: 97 % | HEART RATE: 94 BPM

## 2020-02-27 PROCEDURE — 99213 OFFICE O/P EST LOW 20 MIN: CPT | Performed by: INTERNAL MEDICINE

## 2020-02-27 ASSESSMENT — ENCOUNTER SYMPTOMS: BACK PAIN: 1

## 2020-02-27 NOTE — PROGRESS NOTES
MD Ld   Medication Sig Dispense Refill    Potassium Gluconate  MG TBCR Take 1 tablet by mouth daily      Multiple Vitamins-Minerals (THERAPEUTIC MULTIVITAMIN-MINERALS) tablet Take 1 tablet by mouth daily      oxyCODONE (ROXICODONE) 5 MG immediate release tablet Take 1-2 tablets by mouth every 4 hours as needed for Pain for up to 7 days. 50 tablet 0    methocarbamol (ROBAXIN) 500 MG tablet Take 1.5 tablets by mouth 4 times daily 90 tablet 0    albuterol sulfate (PROAIR RESPICLICK) 971 (90 Base) MCG/ACT aerosol powder inhalation Inhale 2 puffs into the lungs every 4 hours as needed for Wheezing or Shortness of Breath 1 Inhaler 1        Medications patient taking as of now reconciled against medications ordered at time of hospital discharge: Yes    Chief Complaint   Patient presents with    Follow-Up from Hospital       HPI    Inpatient course: Discharge summary reviewed- see chart. Interval history/Current status:     Doing well since discharge. Not doing physical therapy yet, is wearing a back brace and is on bending and lifting restrictions. Pain is well controlled. No problems with bowel or bladder. No weakness or numbness in the legs. He has some bruising on his back from the brace but the incisions itself is healing well according to his wife. He denies shortness of breath. He has a little bit of leg swelling but had this after prior surgery and seems to be improving. Review of Systems   Musculoskeletal: Positive for back pain. Vitals:    02/27/20 1142   BP: 120/62   Pulse: 94   SpO2: 97%   Weight: 130 lb 6.4 oz (59.1 kg)     Body mass index is 19.26 kg/m². Wt Readings from Last 3 Encounters:   02/27/20 130 lb 6.4 oz (59.1 kg)   02/21/20 121 lb 4.1 oz (55 kg)   02/11/20 122 lb (55.3 kg)     BP Readings from Last 3 Encounters:   02/27/20 120/62   02/21/20 109/71   02/19/20 113/72       Physical Exam  Vitals signs reviewed.    Constitutional:       General: He is not in acute distress. Appearance: He is well-developed. HENT:      Head: Normocephalic and atraumatic. Cardiovascular:      Rate and Rhythm: Normal rate and regular rhythm. Heart sounds: Normal heart sounds. Comments: Trace bilateral lower extremity edema  Pulmonary:      Effort: Pulmonary effort is normal. No respiratory distress. Breath sounds: Normal breath sounds. Musculoskeletal:      Comments: Wearing back brace   Skin:     General: Skin is warm and dry. Neurological:      Mental Status: He is alert. Psychiatric:         Behavior: Behavior normal.         Thought Content: Thought content normal.         Judgment: Judgment normal.             Assessment/Plan:  1.  Lumbar stenosis with neurogenic claudication  - doing well post-op   -He will follow-up with surgeon as scheduled  -Call with any concerns        Medical Decision Making: low complexity

## 2020-03-19 ENCOUNTER — HOSPITAL ENCOUNTER (OUTPATIENT)
Dept: GENERAL RADIOLOGY | Age: 60
Discharge: HOME | End: 2020-03-19
Payer: COMMERCIAL

## 2020-03-19 PROCEDURE — 72100 X-RAY EXAM L-S SPINE 2/3 VWS: CPT

## 2020-05-01 ENCOUNTER — HOSPITAL ENCOUNTER (OUTPATIENT)
Dept: GENERAL RADIOLOGY | Age: 60
Discharge: HOME | End: 2020-05-01
Payer: COMMERCIAL

## 2020-05-01 ENCOUNTER — HOSPITAL ENCOUNTER (OUTPATIENT)
Age: 60
Discharge: HOME | End: 2020-05-01
Payer: COMMERCIAL

## 2020-05-01 PROCEDURE — 72100 X-RAY EXAM L-S SPINE 2/3 VWS: CPT

## 2020-07-09 ENCOUNTER — TELEPHONE (OUTPATIENT)
Dept: INTERNAL MEDICINE CLINIC | Age: 60
End: 2020-07-09

## 2020-07-09 NOTE — TELEPHONE ENCOUNTER
Patient sister called to request an appointment due to the patient losing weight and vomiting with sternum and abdominal pain. Please call patient and advise.

## 2020-07-10 ENCOUNTER — OFFICE VISIT (OUTPATIENT)
Dept: INTERNAL MEDICINE CLINIC | Age: 60
End: 2020-07-10
Payer: COMMERCIAL

## 2020-07-10 VITALS
SYSTOLIC BLOOD PRESSURE: 110 MMHG | TEMPERATURE: 98.4 F | BODY MASS INDEX: 16.69 KG/M2 | DIASTOLIC BLOOD PRESSURE: 72 MMHG | WEIGHT: 113 LBS

## 2020-07-10 PROCEDURE — 99214 OFFICE O/P EST MOD 30 MIN: CPT | Performed by: INTERNAL MEDICINE

## 2020-07-10 RX ORDER — PANTOPRAZOLE SODIUM 40 MG/1
40 TABLET, DELAYED RELEASE ORAL
Qty: 90 TABLET | Refills: 1 | Status: SHIPPED | OUTPATIENT
Start: 2020-07-10 | End: 2021-01-06

## 2020-07-10 NOTE — PROGRESS NOTES
7/10/2020     Kita Baird (:  1960) is a 62 y.o. male, here for evaluation of the following medical concerns:    Chief Complaint   Patient presents with    Nausea & Vomiting    Abdominal Pain     every morning, after eating     Other     alot of belching    Weight Loss        HPI    Started about 2 months ago. Every morning vomits. Eating well. Stopped drinking coffee a month ago. Not taking anything. Has some belching in the evening. Generally eating smaller amounts of food but doesn't feel full quickly. Never had this before. Occasionally noting black stools but intermittent. He has also lost weight in the last month. Started in May around the time his wife was diagnosed with GBM. Denies night sweats, early satiety, appetite has been normal.    Review of Systems   Constitutional: Positive for unexpected weight change. Gastrointestinal: Positive for abdominal pain. Prior to Visit Medications    Medication Sig Taking?  Authorizing Provider   pantoprazole (PROTONIX) 40 MG tablet Take 1 tablet by mouth every morning (before breakfast) Yes Meagan Tello MD   albuterol sulfate (PROAIR RESPICLICK) 703 (90 Base) MCG/ACT aerosol powder inhalation Inhale 2 puffs into the lungs every 4 hours as needed for Wheezing or Shortness of Breath Yes Meagan Tello MD   Potassium Gluconate  MG TBCR Take 1 tablet by mouth daily  Historical Provider, MD   Multiple Vitamins-Minerals (THERAPEUTIC MULTIVITAMIN-MINERALS) tablet Take 1 tablet by mouth daily  Historical Provider, MD        Past Medical History:   Diagnosis Date    Arthritis     eft hand and bilateral knee    COPD (chronic obstructive pulmonary disease) ([facility] 2014    Depression     Erectile dysfunction 2014    Seizure ([facility] [facility] 2018    allergic reaction to bactrim    Wears glasses        Past Surgical History:   Procedure Laterality Date    COLECTOMY  2018    partial    HERNIA REPAIR      umbilical    LUMBAR FUSION Right 2020    FACETECTOMY L4-5 RIGHT, TRANSFORAMINAL LUMBAR INTERBODY AND FUSION WITH INTERBODY IMPLANT WITH RODS AND SCREW FIXATION L4-5 WITH C-ARM performed by Moira Calle MD at [address] History     Tobacco Use    Smoking status: Former Smoker     Packs/day: 2.00     Years: 40.00     Pack years: 80.00     Types: Cigarettes     Start date: 1965     Last attempt to quit: 2020     Years since quittin.4    Smokeless tobacco: Never Used   Substance Use Topics    Alcohol use: Yes     Alcohol/week: 35.0 standard drinks     Types: 35 Cans of beer per week        Family History   Problem Relation Age of Onset    Cancer Mother         ovarian     Cancer Father         throat    Cancer Brother         lung       Vitals:    07/10/20 1311   BP: 110/72   Temp: 98.4 °F (36.9 °C)   TempSrc: Oral   Weight: 113 lb (51.3 kg)     Estimated body mass index is 16.69 kg/m² as calculated from the following:    Height as of 20: 5' 9\" (1.753 m). Weight as of this encounter: 113 lb (51.3 kg). Physical Exam  Vitals signs reviewed. Constitutional:       General: He is not in acute distress. Appearance: He is well-developed. HENT:      Head: Normocephalic and atraumatic. Cardiovascular:      Rate and Rhythm: Normal rate and regular rhythm. Heart sounds: Normal heart sounds. Pulmonary:      Effort: Pulmonary effort is normal. No respiratory distress. Breath sounds: Normal breath sounds. Abdominal:      General: Bowel sounds are normal.      Palpations: Abdomen is soft. Tenderness: There is abdominal tenderness in the epigastric area. There is no guarding or rebound. Skin:     General: Skin is warm and dry. Neurological:      Mental Status: He is alert. Psychiatric:         Behavior: Behavior normal.         Thought Content: Thought content normal.         Judgment: Judgment normal.         ASSESSMENT/PLAN:  1.  Unintentional weight loss  - possibly stress related - started around the time his wife was diagnosed with cancer. Does have smoking history. No B symptoms. Possibly due to reduced PO intake due to abdominal pain. Check labs, may need imaging, reassess in 1 month  - Comprehensive Metabolic Panel; Future  - CBC Auto Differential; Future  - TSH with Reflex; Future    2. Epigastric pain  - GERD, ulcer possible from stress  - start pantoprazole 40mg daily      Return in about 1 month (around 8/10/2020) for weight loss.

## 2020-07-10 NOTE — TELEPHONE ENCOUNTER
Spoke with patient and advised to come in and be seen at 1:20pm.   Patient scheduled, and verbalized understanding.

## 2020-07-12 ASSESSMENT — ENCOUNTER SYMPTOMS: ABDOMINAL PAIN: 1

## 2020-07-20 LAB
A/G RATIO: 1.3 (ref 1.1–2.2)
ALBUMIN SERPL-MCNC: 3 G/DL (ref 3.4–5)
ALP BLD-CCNC: 840 U/L (ref 40–129)
ALT SERPL-CCNC: 104 U/L (ref 10–40)
ANION GAP SERPL CALCULATED.3IONS-SCNC: 16 MMOL/L (ref 3–16)
AST SERPL-CCNC: 200 U/L (ref 15–37)
BASOPHILS ABSOLUTE: 0 K/UL (ref 0–0.2)
BASOPHILS RELATIVE PERCENT: 0 %
BILIRUB SERPL-MCNC: 4.4 MG/DL (ref 0–1)
BUN BLDV-MCNC: 4 MG/DL (ref 7–20)
CALCIUM SERPL-MCNC: 7.8 MG/DL (ref 8.3–10.6)
CHLORIDE BLD-SCNC: 87 MMOL/L (ref 99–110)
CHOLESTEROL, TOTAL: 1033 MG/DL (ref 0–199)
CO2: 20 MMOL/L (ref 21–32)
CREAT SERPL-MCNC: <0.5 MG/DL (ref 0.9–1.3)
EOSINOPHILS ABSOLUTE: 0 K/UL (ref 0–0.6)
EOSINOPHILS RELATIVE PERCENT: 0 %
GFR AFRICAN AMERICAN: >60
GFR NON-AFRICAN AMERICAN: >60
GLOBULIN: 2.4 G/DL
GLUCOSE BLD-MCNC: 101 MG/DL (ref 70–99)
HCT VFR BLD CALC: 40.1 % (ref 40.5–52.5)
HDLC SERPL-MCNC: <3 MG/DL (ref 40–60)
HEMOGLOBIN: 14.5 G/DL (ref 13.5–17.5)
LDL CHOLESTEROL CALCULATED: ABNORMAL MG/DL
LDL CHOLESTEROL DIRECT: 86 MG/DL
LYMPHOCYTES ABSOLUTE: 0.7 K/UL (ref 1–5.1)
LYMPHOCYTES RELATIVE PERCENT: 10 %
MCH RBC QN AUTO: 38.1 PG (ref 26–34)
MCHC RBC AUTO-ENTMCNC: 36 G/DL (ref 31–36)
MCV RBC AUTO: 105.6 FL (ref 80–100)
MONOCYTES ABSOLUTE: 0.6 K/UL (ref 0–1.3)
MONOCYTES RELATIVE PERCENT: 8 %
NEUTROPHILS ABSOLUTE: 6.1 K/UL (ref 1.7–7.7)
NEUTROPHILS RELATIVE PERCENT: 82 %
PDW BLD-RTO: 15.4 % (ref 12.4–15.4)
PLATELET # BLD: 147 K/UL (ref 135–450)
PMV BLD AUTO: 9.7 FL (ref 5–10.5)
POTASSIUM SERPL-SCNC: 3.9 MMOL/L (ref 3.5–5.1)
RBC # BLD: 3.8 M/UL (ref 4.2–5.9)
SODIUM BLD-SCNC: 123 MMOL/L (ref 136–145)
TOTAL PROTEIN: 5.4 G/DL (ref 6.4–8.2)
TRIGL SERPL-MCNC: >4425 MG/DL (ref 0–150)
TSH REFLEX: 3.93 UIU/ML (ref 0.27–4.2)
VLDLC SERPL CALC-MCNC: ABNORMAL MG/DL
WBC # BLD: 7.4 K/UL (ref 4–11)

## 2020-07-22 DIAGNOSIS — R10.13 EPIGASTRIC PAIN: ICD-10-CM

## 2020-07-22 LAB — LIPASE: 289 U/L (ref 13–60)

## 2020-07-23 ENCOUNTER — TELEPHONE (OUTPATIENT)
Dept: INTERNAL MEDICINE CLINIC | Age: 60
End: 2020-07-23

## 2020-07-23 ENCOUNTER — HOSPITAL ENCOUNTER (OUTPATIENT)
Dept: ULTRASOUND IMAGING | Age: 60
Discharge: HOME | End: 2020-07-23
Payer: COMMERCIAL

## 2020-07-23 PROCEDURE — 76705 ECHO EXAM OF ABDOMEN: CPT

## 2020-07-23 NOTE — TELEPHONE ENCOUNTER
Pt states someone called and left a msg, that he just stepped away for 5 mins. Stated that someone left a msg saying they will either call back to day or call tomorrow and he will be waiting. Pt just wanted office to know and pass on to provider.

## 2020-07-25 DIAGNOSIS — R10.13 EPIGASTRIC PAIN: ICD-10-CM

## 2020-07-25 DIAGNOSIS — R79.89 ELEVATED LFTS: ICD-10-CM

## 2020-07-25 DIAGNOSIS — D75.89 MACROCYTOSIS: ICD-10-CM

## 2020-07-25 LAB
A/G RATIO: 1 (ref 1.1–2.2)
ALBUMIN SERPL-MCNC: 2.7 G/DL (ref 3.4–5)
ALP BLD-CCNC: 1101 U/L (ref 40–129)
ALT SERPL-CCNC: 86 U/L (ref 10–40)
ANION GAP SERPL CALCULATED.3IONS-SCNC: 18 MMOL/L (ref 3–16)
AST SERPL-CCNC: 173 U/L (ref 15–37)
ATYPICAL LYMPHOCYTE RELATIVE PERCENT: 1 % (ref 0–6)
BANDED NEUTROPHILS RELATIVE PERCENT: 6 % (ref 0–7)
BASOPHILS ABSOLUTE: 0.1 K/UL (ref 0–0.2)
BASOPHILS RELATIVE PERCENT: 1 %
BILIRUB SERPL-MCNC: 4.5 MG/DL (ref 0–1)
BUN BLDV-MCNC: 5 MG/DL (ref 7–20)
CALCIUM SERPL-MCNC: 7.9 MG/DL (ref 8.3–10.6)
CHLORIDE BLD-SCNC: 93 MMOL/L (ref 99–110)
CHOLESTEROL, TOTAL: 649 MG/DL (ref 0–199)
CO2: 25 MMOL/L (ref 21–32)
CREAT SERPL-MCNC: <0.5 MG/DL (ref 0.9–1.3)
EOSINOPHILS ABSOLUTE: 0 K/UL (ref 0–0.6)
EOSINOPHILS RELATIVE PERCENT: 0 %
GFR AFRICAN AMERICAN: >60
GFR NON-AFRICAN AMERICAN: >60
GLOBULIN: 2.8 G/DL
GLUCOSE BLD-MCNC: 96 MG/DL (ref 70–99)
HCT VFR BLD CALC: 39.9 % (ref 40.5–52.5)
HDLC SERPL-MCNC: 7 MG/DL (ref 40–60)
HEMOGLOBIN: 13.5 G/DL (ref 13.5–17.5)
INR BLD: 0.98 (ref 0.86–1.14)
LDL CHOLESTEROL CALCULATED: ABNORMAL MG/DL
LDL CHOLESTEROL DIRECT: 109 MG/DL
LIPASE: 181 U/L (ref 13–60)
LYMPHOCYTES ABSOLUTE: 0.3 K/UL (ref 1–5.1)
LYMPHOCYTES RELATIVE PERCENT: 5 %
MACROCYTES: ABNORMAL
MCH RBC QN AUTO: 35.2 PG (ref 26–34)
MCHC RBC AUTO-ENTMCNC: 33.8 G/DL (ref 31–36)
MCV RBC AUTO: 104.1 FL (ref 80–100)
MONOCYTES ABSOLUTE: 0.3 K/UL (ref 0–1.3)
MONOCYTES RELATIVE PERCENT: 5 %
NEUTROPHILS ABSOLUTE: 4.4 K/UL (ref 1.7–7.7)
NEUTROPHILS RELATIVE PERCENT: 82 %
OVALOCYTES: ABNORMAL
PDW BLD-RTO: 15.2 % (ref 12.4–15.4)
PLATELET # BLD: 174 K/UL (ref 135–450)
PMV BLD AUTO: 9.4 FL (ref 5–10.5)
POTASSIUM SERPL-SCNC: 4.2 MMOL/L (ref 3.5–5.1)
PROTHROMBIN TIME: 11.4 SEC (ref 10–13.2)
RBC # BLD: 3.83 M/UL (ref 4.2–5.9)
SODIUM BLD-SCNC: 136 MMOL/L (ref 136–145)
TARGET CELLS: ABNORMAL
TOTAL PROTEIN: 5.5 G/DL (ref 6.4–8.2)
TRIGL SERPL-MCNC: 1527 MG/DL (ref 0–150)
VITAMIN B-12: 1218 PG/ML (ref 211–911)
VLDLC SERPL CALC-MCNC: ABNORMAL MG/DL
WBC # BLD: 5 K/UL (ref 4–11)

## 2020-07-27 ENCOUNTER — ANTI-COAG VISIT (OUTPATIENT)
Dept: INTERNAL MEDICINE CLINIC | Age: 60
End: 2020-07-27

## 2020-07-27 DIAGNOSIS — R74.8 ELEVATED ALKALINE PHOSPHATASE LEVEL: ICD-10-CM

## 2020-07-27 LAB — GAMMA GLUTAMYL TRANSFERASE: 3199 U/L (ref 8–61)

## 2020-07-28 ENCOUNTER — HOSPITAL ENCOUNTER (INPATIENT)
Age: 60
LOS: 2 days | Discharge: HOME | DRG: 438 | End: 2020-07-30
Attending: EMERGENCY MEDICINE | Admitting: HOSPITALIST
Payer: COMMERCIAL

## 2020-07-28 ENCOUNTER — APPOINTMENT (OUTPATIENT)
Dept: CT IMAGING | Age: 60
DRG: 438 | End: 2020-07-28
Payer: COMMERCIAL

## 2020-07-28 PROBLEM — R10.9 ABDOMINAL PAIN: Status: ACTIVE | Noted: 2020-07-28

## 2020-07-28 LAB
A/G RATIO: 0.9 (ref 1.1–2.2)
ALBUMIN SERPL-MCNC: 3 G/DL (ref 3.4–5)
ALP BLD-CCNC: 974 U/L (ref 40–129)
ALT SERPL-CCNC: 90 U/L (ref 10–40)
AMMONIA: 61 UMOL/L (ref 16–60)
ANION GAP SERPL CALCULATED.3IONS-SCNC: 11 MMOL/L (ref 3–16)
APTT: 29.2 SEC (ref 24.2–36.2)
AST SERPL-CCNC: 166 U/L (ref 15–37)
BASOPHILS ABSOLUTE: 0.1 K/UL (ref 0–0.2)
BASOPHILS RELATIVE PERCENT: 2.1 %
BILIRUB SERPL-MCNC: 3.3 MG/DL (ref 0–1)
BUN BLDV-MCNC: 3 MG/DL (ref 7–20)
CALCIUM SERPL-MCNC: 8.3 MG/DL (ref 8.3–10.6)
CHLORIDE BLD-SCNC: 98 MMOL/L (ref 99–110)
CO2: 27 MMOL/L (ref 21–32)
CREAT SERPL-MCNC: <0.5 MG/DL (ref 0.9–1.3)
EOSINOPHILS ABSOLUTE: 0 K/UL (ref 0–0.6)
EOSINOPHILS RELATIVE PERCENT: 0.8 %
GFR AFRICAN AMERICAN: >60
GFR NON-AFRICAN AMERICAN: >60
GLOBULIN: 3.2 G/DL
GLUCOSE BLD-MCNC: 103 MG/DL (ref 70–99)
HCT VFR BLD CALC: 36.8 % (ref 40.5–52.5)
HEMOGLOBIN: 12.7 G/DL (ref 13.5–17.5)
INR BLD: 0.98 (ref 0.86–1.14)
LIPASE: 132 U/L (ref 13–60)
LYMPHOCYTES ABSOLUTE: 0.9 K/UL (ref 1–5.1)
LYMPHOCYTES RELATIVE PERCENT: 16.6 %
MCH RBC QN AUTO: 35.8 PG (ref 26–34)
MCHC RBC AUTO-ENTMCNC: 34.5 G/DL (ref 31–36)
MCV RBC AUTO: 103.7 FL (ref 80–100)
MONOCYTES ABSOLUTE: 0.8 K/UL (ref 0–1.3)
MONOCYTES RELATIVE PERCENT: 14.4 %
NEUTROPHILS ABSOLUTE: 3.6 K/UL (ref 1.7–7.7)
NEUTROPHILS RELATIVE PERCENT: 66.1 %
PDW BLD-RTO: 15.5 % (ref 12.4–15.4)
PLATELET # BLD: 222 K/UL (ref 135–450)
PMV BLD AUTO: 7.8 FL (ref 5–10.5)
POTASSIUM REFLEX MAGNESIUM: 3.8 MMOL/L (ref 3.5–5.1)
PROTHROMBIN TIME: 11.4 SEC (ref 10–13.2)
RBC # BLD: 3.54 M/UL (ref 4.2–5.9)
SODIUM BLD-SCNC: 136 MMOL/L (ref 136–145)
TOTAL PROTEIN: 6.2 G/DL (ref 6.4–8.2)
WBC # BLD: 5.4 K/UL (ref 4–11)

## 2020-07-28 PROCEDURE — 83516 IMMUNOASSAY NONANTIBODY: CPT

## 2020-07-28 PROCEDURE — 86235 NUCLEAR ANTIGEN ANTIBODY: CPT

## 2020-07-28 PROCEDURE — 99223 1ST HOSP IP/OBS HIGH 75: CPT | Performed by: HOSPITALIST

## 2020-07-28 PROCEDURE — 6360000004 HC RX CONTRAST MEDICATION: Performed by: PHYSICIAN ASSISTANT

## 2020-07-28 PROCEDURE — 85610 PROTHROMBIN TIME: CPT

## 2020-07-28 PROCEDURE — 86225 DNA ANTIBODY NATIVE: CPT

## 2020-07-28 PROCEDURE — 85025 COMPLETE CBC W/AUTO DIFF WBC: CPT

## 2020-07-28 PROCEDURE — 2580000003 HC RX 258: Performed by: STUDENT IN AN ORGANIZED HEALTH CARE EDUCATION/TRAINING PROGRAM

## 2020-07-28 PROCEDURE — 86038 ANTINUCLEAR ANTIBODIES: CPT

## 2020-07-28 PROCEDURE — 80074 ACUTE HEPATITIS PANEL: CPT

## 2020-07-28 PROCEDURE — 6360000002 HC RX W HCPCS: Performed by: STUDENT IN AN ORGANIZED HEALTH CARE EDUCATION/TRAINING PROGRAM

## 2020-07-28 PROCEDURE — 83690 ASSAY OF LIPASE: CPT

## 2020-07-28 PROCEDURE — 36415 COLL VENOUS BLD VENIPUNCTURE: CPT

## 2020-07-28 PROCEDURE — 99285 EMERGENCY DEPT VISIT HI MDM: CPT

## 2020-07-28 PROCEDURE — 86039 ANTINUCLEAR ANTIBODIES (ANA): CPT

## 2020-07-28 PROCEDURE — 6370000000 HC RX 637 (ALT 250 FOR IP): Performed by: STUDENT IN AN ORGANIZED HEALTH CARE EDUCATION/TRAINING PROGRAM

## 2020-07-28 PROCEDURE — 1200000000 HC SEMI PRIVATE

## 2020-07-28 PROCEDURE — 80053 COMPREHEN METABOLIC PANEL: CPT

## 2020-07-28 PROCEDURE — 86376 MICROSOMAL ANTIBODY EACH: CPT

## 2020-07-28 PROCEDURE — 85730 THROMBOPLASTIN TIME PARTIAL: CPT

## 2020-07-28 PROCEDURE — 74177 CT ABD & PELVIS W/CONTRAST: CPT

## 2020-07-28 PROCEDURE — 82140 ASSAY OF AMMONIA: CPT

## 2020-07-28 PROCEDURE — 6360000002 HC RX W HCPCS: Performed by: EMERGENCY MEDICINE

## 2020-07-28 RX ORDER — MORPHINE SULFATE 4 MG/ML
4 INJECTION, SOLUTION INTRAMUSCULAR; INTRAVENOUS ONCE
Status: COMPLETED | OUTPATIENT
Start: 2020-07-28 | End: 2020-07-28

## 2020-07-28 RX ORDER — SODIUM CHLORIDE 0.9 % (FLUSH) 0.9 %
10 SYRINGE (ML) INJECTION PRN
Status: DISCONTINUED | OUTPATIENT
Start: 2020-07-28 | End: 2020-07-30 | Stop reason: HOSPADM

## 2020-07-28 RX ORDER — POLYETHYLENE GLYCOL 3350 17 G/17G
17 POWDER, FOR SOLUTION ORAL DAILY PRN
Status: DISCONTINUED | OUTPATIENT
Start: 2020-07-28 | End: 2020-07-30 | Stop reason: HOSPADM

## 2020-07-28 RX ORDER — SODIUM CHLORIDE 9 MG/ML
INJECTION, SOLUTION INTRAVENOUS CONTINUOUS
Status: DISCONTINUED | OUTPATIENT
Start: 2020-07-28 | End: 2020-07-30 | Stop reason: HOSPADM

## 2020-07-28 RX ORDER — ONDANSETRON 2 MG/ML
4 INJECTION INTRAMUSCULAR; INTRAVENOUS EVERY 6 HOURS PRN
Status: DISCONTINUED | OUTPATIENT
Start: 2020-07-28 | End: 2020-07-30 | Stop reason: HOSPADM

## 2020-07-28 RX ORDER — ACETAMINOPHEN 325 MG/1
650 TABLET ORAL EVERY 6 HOURS PRN
Status: DISCONTINUED | OUTPATIENT
Start: 2020-07-28 | End: 2020-07-30 | Stop reason: HOSPADM

## 2020-07-28 RX ORDER — ACETAMINOPHEN 325 MG/1
650 TABLET ORAL EVERY 6 HOURS PRN
Status: ON HOLD | COMMUNITY
End: 2020-07-30 | Stop reason: HOSPADM

## 2020-07-28 RX ORDER — IPRATROPIUM BROMIDE AND ALBUTEROL SULFATE 2.5; .5 MG/3ML; MG/3ML
1 SOLUTION RESPIRATORY (INHALATION) EVERY 4 HOURS PRN
Status: DISCONTINUED | OUTPATIENT
Start: 2020-07-28 | End: 2020-07-30 | Stop reason: HOSPADM

## 2020-07-28 RX ORDER — PANTOPRAZOLE SODIUM 40 MG/1
40 TABLET, DELAYED RELEASE ORAL
Status: DISCONTINUED | OUTPATIENT
Start: 2020-07-29 | End: 2020-07-30 | Stop reason: HOSPADM

## 2020-07-28 RX ORDER — OMEGA-3-ACID ETHYL ESTERS 1 G/1
2 CAPSULE, LIQUID FILLED ORAL 2 TIMES DAILY
Status: DISCONTINUED | OUTPATIENT
Start: 2020-07-28 | End: 2020-07-30 | Stop reason: HOSPADM

## 2020-07-28 RX ORDER — IPRATROPIUM BROMIDE AND ALBUTEROL SULFATE 2.5; .5 MG/3ML; MG/3ML
1 SOLUTION RESPIRATORY (INHALATION)
Status: DISCONTINUED | OUTPATIENT
Start: 2020-07-28 | End: 2020-07-28

## 2020-07-28 RX ORDER — ACETAMINOPHEN 650 MG/1
650 SUPPOSITORY RECTAL EVERY 6 HOURS PRN
Status: DISCONTINUED | OUTPATIENT
Start: 2020-07-28 | End: 2020-07-30 | Stop reason: HOSPADM

## 2020-07-28 RX ORDER — SODIUM CHLORIDE 0.9 % (FLUSH) 0.9 %
10 SYRINGE (ML) INJECTION EVERY 12 HOURS SCHEDULED
Status: DISCONTINUED | OUTPATIENT
Start: 2020-07-28 | End: 2020-07-30 | Stop reason: HOSPADM

## 2020-07-28 RX ORDER — PROMETHAZINE HYDROCHLORIDE 12.5 MG/1
12.5 TABLET ORAL EVERY 6 HOURS PRN
Status: DISCONTINUED | OUTPATIENT
Start: 2020-07-28 | End: 2020-07-30 | Stop reason: HOSPADM

## 2020-07-28 RX ADMIN — Medication 10 ML: at 20:15

## 2020-07-28 RX ADMIN — ENOXAPARIN SODIUM 40 MG: 40 INJECTION SUBCUTANEOUS at 20:14

## 2020-07-28 RX ADMIN — OMEGA-3-ACID ETHYL ESTERS 2 G: 1 CAPSULE, LIQUID FILLED ORAL at 20:13

## 2020-07-28 RX ADMIN — SODIUM CHLORIDE: 9 INJECTION, SOLUTION INTRAVENOUS at 20:13

## 2020-07-28 RX ADMIN — MORPHINE SULFATE 4 MG: 4 INJECTION INTRAVENOUS at 17:13

## 2020-07-28 RX ADMIN — IOPAMIDOL 80 ML: 755 INJECTION, SOLUTION INTRAVENOUS at 13:56

## 2020-07-28 ASSESSMENT — ENCOUNTER SYMPTOMS
ABDOMINAL PAIN: 1
CONSTIPATION: 0
SHORTNESS OF BREATH: 0
VOMITING: 0
DIARRHEA: 0
NAUSEA: 1
BLOOD IN STOOL: 0
COUGH: 0
RECTAL PAIN: 0

## 2020-07-28 ASSESSMENT — PAIN DESCRIPTION - PAIN TYPE: TYPE: ACUTE PAIN;CHRONIC PAIN

## 2020-07-28 ASSESSMENT — PAIN SCALES - GENERAL
PAINLEVEL_OUTOF10: 0
PAINLEVEL_OUTOF10: 4
PAINLEVEL_OUTOF10: 2

## 2020-07-28 ASSESSMENT — PAIN DESCRIPTION - LOCATION: LOCATION: ABDOMEN

## 2020-07-28 NOTE — H&P
Internal Medicine PGY- 1 Resident History & Physical      PCP: Chito Bell MD    Date of Admission: 7/28/2020    Chief Complaint:  Abdominal pain    History Of Present Illness:      Edna Ni is a 62 y.o. male w/ PMHX of COPD, alcohol and tobacco abuse, and HTN who presented to [facility], [facility]. with worsening periumbilical pain over the last several months. He complains of abdominal distension and discomfort, unintentional weight loss with poor appetite and poor energy for the last few months. He reports pain that is 2/10 today in intensity and is intermittent. He did not experience a sudden worsening today, rather he came in today because he was waiting until his wife underwent radiation for a brain tumor (Monday). He endorses drinking minimum of 35 beers a day since he was 25 yo. He also endorses a 40+ pack-year smoking history. In the ED, he was found to have elevated AST of 166, ALT of 90, total bilirubin of 3.3, lipase of 132. His GGT was taken a few days ago and came back elevated at 3199. CT scan today showed no acute findings, along with diffuse hepatic steatosis, and a small amount of fluid adjacent to the pancreatic tail and along the anterior left pararenal fascia. A recent RUQ U/S (7/23) demonstrated a decompressed gallbladder with thickened walls, fatty liver.     Past Medical History:        Diagnosis Date    Alcoholic ([address]     Arthritis     eft hand and bilateral knee    COPD (chronic obstructive pulmonary disease) ([address] 4/22/2014    Depression     Erectile dysfunction 4/22/2014    Seizure ([address] 2018    allergic reaction to bactrim    Wears glasses        Past Surgical History:          Procedure Laterality Date    COLECTOMY  2018    partial    HERNIA REPAIR      umbilical    LUMBAR FUSION Right 2/19/2020    FACETECTOMY L4-5 RIGHT, TRANSFORAMINAL LUMBAR INTERBODY AND FUSION WITH INTERBODY IMPLANT WITH RODS AND SCREW FIXATION L4-5 WITH C-ARM performed by Olivia Jones Ayesha Escobedo MD at [place] [id number]       Medications Prior to Admission:      Prior to Admission medications    Medication Sig Start Date End Date Taking? Authorizing Provider   pantoprazole (PROTONIX) 40 MG tablet Take 1 tablet by mouth every morning (before breakfast) 7/10/20   Yara Renae MD   Potassium Gluconate  MG TBCR Take 1 tablet by mouth daily    Historical Provider, MD   Multiple Vitamins-Minerals (THERAPEUTIC MULTIVITAMIN-MINERALS) tablet Take 1 tablet by mouth daily    Historical Provider, MD   albuterol sulfate (PROAIR RESPICLICK) 571 (90 Base) MCG/ACT aerosol powder inhalation Inhale 2 puffs into the lungs every 4 hours as needed for Wheezing or Shortness of Breath 2/11/20   Yara Renae MD       Allergies:  Bactrim [sulfamethoxazole-trimethoprim]    Social History:          TOBACCO:   reports that he quit smoking about 5 months ago. His smoking use included cigarettes. He started smoking about 55 years ago. He has a 80.00 pack-year smoking history. He has never used smokeless tobacco.  ETOH:   reports current alcohol use of about 35.0 standard drinks of alcohol per week.  with 2 adult children. Works at [employer]. Takes care of his ADLs. History:          Problem Relation Age of Onset    Cancer Mother         ovarian     Cancer Father         throat    Cancer Brother         lung       REVIEW OF SYSTEMS:   Pertinentpositives as noted in the HPI. All other systems reviewed and negative. ROS: Review of Systems   Constitutional: Positive for appetite change, fatigue and unexpected weight change. Negative for fever. Respiratory: Negative for cough. Cardiovascular: Positive for leg swelling. Negative for chest pain and palpitations. Gastrointestinal: Negative for blood in stool, constipation and diarrhea. Skin: Positive for rash. Neurological: Negative for headaches. 10 point ROS negative except as listed above.     PHYSICALEXAM PERFORMED:    BP 138/82   Pulse 90   Temp 98.8 °F (37.1 °C) (Oral)   Resp 16   Ht 5' 9\" (1.753 m)   Wt 115 lb (52.2 kg)   SpO2 92%   BMI 16.98 kg/m²     General appearance:  No apparent distress, cooperative. HEENT:  Normocephalic,  EOM intact. Mild scleral icterus  Respiratory:  Normal respiratory effort. Clear to auscultation. Bilateral expiratory wheezes, L>R. Cardiovascular:  Regular rate and rhythm with normal S1/S2 without murmurs, rubs or gallops. Abdomen: Soft, non-tender, distended with normal bowel sounds. Marked hepatomegaly. Musculoskeletal:  No clubbing, cyanosis or edemabilaterally. Full range of motion without deformity. 3+ edema bilaterally from foot to ankle, 2+ from ankle to lower leg bilaterally. Asterixis noted. Skin: Purpuric rash on bilateral forearms. Neurologic: Alert and Oriented x3. No focal neurological deficits. Psychiatric:  Thought content appropriate, normal insight  Peripheral Pulses: +2 palpable, equal bilaterally     Labs:     Recent Labs     07/28/20  1317   WBC 5.4   HGB 12.7*   HCT 36.8*        Recent Labs     07/28/20  1317      K 3.8   CL 98*   CO2 27   BUN 3*   CREATININE <0.5*   CALCIUM 8.3     Recent Labs     07/28/20  1317   *   ALT 90*   BILITOT 3.3*   ALKPHOS 974*     Recent Labs     07/28/20  1317   INR 0.98     No results for input(s): CKTOTAL, TROPONINI in the last 72 hours. Urinalysis:   Lab Results   Component Value Date    NITRU Negative 02/14/2020    WBCUA 0 02/14/2020    RBCUA 0 02/14/2020    BLOODU Negative 02/14/2020    SPECGRAV 1.012 02/14/2020    GLUCOSEU Negative 02/14/2020       Radiology:         CT ABDOMEN PELVIS W IV CONTRAST Additional Contrast? None   Final Result      1. No discrete findings within the abdomen or pelvis to explain patient's symptoms. 2.  Colonic diverticulosis without findings of diverticulitis. 3.  Diffuse hepatic steatosis.       4.  Small amount of fluid adjacent to the pancreatic tail and along the anterior left pararenal fascia of unclear significance or etiology. Correlate clinically. 5.  No findings for nephro ureterolithiasis or obstructive uropathy. Small right renal cyst containing focal peripheral calcification. 6.  Prostatic hypertrophy. ASSESSMENT & PLAN:    Mr. Laura Ellsworth is a 61 yo M with a PMH of COPD, HTN, and alcohol/tobacco abuse who presented with a several month-history of worsening abdominal pain and decreased appetite. #Abdominal Pain - 2/2 suspected pancreatitis vs alcoholic hepatitis vs cholangiocarcinoma  CT: no acute findings, diffuse hepatic steatosis, small amt of fluid adj to pancreatic tail and along anterior left pararenal fascia. Lipase 132. GGT 3199. TGs elevated- trending down from 4425 (7/20) to 1527 (7/25)  - Lovasa 2g BID  - MRCP ordered  - Autoimmune hepatitis panel pending, DAVID pending  - Viral hepatitis panel pending  - Ammonia pending  - Gentle fluids  - GI consulted     #Chronic Alcohol Abuse  Hx of 35 drinks/wk since age 27 yo. , ALT 90. Hepatomegaly on physical exam.  - CIWA w/o ativan    #COPD  Smoked > 40 pack-years  - On duonebs    DVTProphylaxis: Lovenox 40  Diet: Low fat   Code Status: Limited Code  I will discuss the patient with MD Suzan Chavis MD  Internal Medicine Resident, PGY-1  Contact via [employer]    Addendum to Resident H& P/Progress note:  I have personally seen,examined and evaluated the patient. I have reviewed the current history, physical findings, labs and assessment and plan and agree with note as documented by resident MD ( Maegan Pérez)  Ivonne Calloway  Discussed the patient's condition with RN. May need to be started on Gia.     Isauro Crow MD, FACP

## 2020-07-28 NOTE — ED TRIAGE NOTES
Upper left abd pain that radiates to rt, x several months, states he saw fp last week who told  Him he need to go to the ER for abnormal labs.

## 2020-07-28 NOTE — ED PROVIDER NOTES
Normal range of motion. Comments: 2+ pitting edema to bilateral feet, no pretibial pitting edema. Skin:     General: Skin is warm and dry. Comments: Atypical bruising across his forearms, denies any inciting injury. Neurological:      General: No focal deficit present. Mental Status: He is alert and oriented to person, place, and time. Psychiatric:         Mood and Affect: Mood normal.         Behavior: Behavior normal.           Diagnostic Results     RADIOLOGY:  CT ABDOMEN PELVIS W IV CONTRAST Additional Contrast? None   Final Result      1. No discrete findings within the abdomen or pelvis to explain patient's symptoms. 2.  Colonic diverticulosis without findings of diverticulitis. 3.  Diffuse hepatic steatosis. 4.  Small amount of fluid adjacent to the pancreatic tail and along the anterior left pararenal fascia of unclear significance or etiology. Correlate clinically. 5.  No findings for nephro ureterolithiasis or obstructive uropathy. Small right renal cyst containing focal peripheral calcification. 6.  Prostatic hypertrophy.         LABS:   Results for orders placed or performed during the hospital encounter of 07/28/20   CBC Auto Differential   Result Value Ref Range    WBC 5.4 4.0 - 11.0 K/uL    RBC 3.54 (L) 4.20 - 5.90 M/uL    Hemoglobin 12.7 (L) 13.5 - 17.5 g/dL    Hematocrit 36.8 (L) 40.5 - 52.5 %    .7 (H) 80.0 - 100.0 fL    MCH 35.8 (H) 26.0 - 34.0 pg    MCHC 34.5 31.0 - 36.0 g/dL    RDW 15.5 (H) 12.4 - 15.4 %    Platelets 983 853 - 968 K/uL    MPV 7.8 5.0 - 10.5 fL    Neutrophils % 66.1 %    Lymphocytes % 16.6 %    Monocytes % 14.4 %    Eosinophils % 0.8 %    Basophils % 2.1 %    Neutrophils Absolute 3.6 1.7 - 7.7 K/uL    Lymphocytes Absolute 0.9 (L) 1.0 - 5.1 K/uL    Monocytes Absolute 0.8 0.0 - 1.3 K/uL    Eosinophils Absolute 0.0 0.0 - 0.6 K/uL    Basophils Absolute 0.1 0.0 - 0.2 K/uL   Comprehensive Metabolic Panel w/ Reflex to MG   Result hemodynamically stable throughout their stay in the emergency department, and appears well overall. His abdomen is distended and ascites light, though soft with diffuse tenderness most focally in the right upper quadrant. Patient's lab work concerning for an elevated alk phos of 974, AST of 166 and bilirubin of 3.3 though this is downtrending from his most recent lab work 3 days ago. CT scan was obtained given the patient's pain and shows no acute findings to potentially explain his symptoms. I did discuss this patient with his primary care doctor who agrees with the plan for admission for further evaluation of his symptoms and unintentional weight loss. I discussed this with the admission team residents as well. At this point in time, patient will require admission to the hospital for further management of their clinical condition. I spoke with the admitting team who is in agreement with plan for admission. Patient and family are in agreement with plan for admission. Patient will be cared for in the ED prior to a bed becoming available upstairs. The patient was evaluated by myself and the ED Attending Physician, Dr. Cristy Bell. All management and disposition plans were discussed and agreed upon. Clinical Impression     1.  Abdominal pain, unspecified abdominal location        Disposition     DISPOSITION Admitted 07/28/2020 04:45:44 PM     [place], [place]  07/28/20 7254

## 2020-07-29 ENCOUNTER — APPOINTMENT (OUTPATIENT)
Dept: MRI IMAGING | Age: 60
DRG: 438 | End: 2020-07-29
Payer: COMMERCIAL

## 2020-07-29 PROBLEM — E43 SEVERE MALNUTRITION (HCC): Chronic | Status: ACTIVE | Noted: 2020-07-29

## 2020-07-29 LAB
A/G RATIO: 0.8 (ref 1.1–2.2)
ALBUMIN SERPL-MCNC: 2.3 G/DL (ref 3.4–5)
ALP BLD-CCNC: 795 U/L (ref 40–129)
ALT SERPL-CCNC: 68 U/L (ref 10–40)
ANION GAP SERPL CALCULATED.3IONS-SCNC: 9 MMOL/L (ref 3–16)
ANTI-CENTROMERE B IGG: <0.2 AI (ref 0–0.9)
ANTI-CHROMATIN IGG: <0.2 AI (ref 0–0.9)
ANTI-DSDNA IGG: <1 IU/ML (ref 0–9)
ANTI-JO1 IGG: <0.2 AI (ref 0–0.9)
ANTI-RIBOSOMAL P IGG: <0.2 AI (ref 0–0.9)
ANTI-RNP IGG: 2.7 AI (ref 0–0.9)
ANTI-SCL70 IGG: <0.2 AI (ref 0–0.9)
ANTI-SMITH IGG: <0.2 AI (ref 0–0.9)
ANTI-SMRNP IGG: <0.2 AI (ref 0–0.9)
ANTI-SS-A IGG: <0.2 AI (ref 0–0.9)
ANTI-SS-B IGG: <0.2 AI (ref 0–0.9)
AST SERPL-CCNC: 127 U/L (ref 15–37)
BASOPHILS ABSOLUTE: 0.2 K/UL (ref 0–0.2)
BASOPHILS RELATIVE PERCENT: 4.2 %
BILIRUB SERPL-MCNC: 2.5 MG/DL (ref 0–1)
BUN BLDV-MCNC: 2 MG/DL (ref 7–20)
CALCIUM SERPL-MCNC: 7.6 MG/DL (ref 8.3–10.6)
CHLORIDE BLD-SCNC: 101 MMOL/L (ref 99–110)
CO2: 25 MMOL/L (ref 21–32)
CREAT SERPL-MCNC: <0.5 MG/DL (ref 0.9–1.3)
EOSINOPHILS ABSOLUTE: 0.1 K/UL (ref 0–0.6)
EOSINOPHILS RELATIVE PERCENT: 1.6 %
GFR AFRICAN AMERICAN: >60
GFR NON-AFRICAN AMERICAN: >60
GLOBULIN: 3 G/DL
GLUCOSE BLD-MCNC: 96 MG/DL (ref 70–99)
HAV IGM SER IA-ACNC: NORMAL
HCT VFR BLD CALC: 33.7 % (ref 40.5–52.5)
HEMOGLOBIN: 11.6 G/DL (ref 13.5–17.5)
HEPATITIS B CORE IGM ANTIBODY: NORMAL
HEPATITIS B SURFACE ANTIGEN INTERPRETATION: NORMAL
HEPATITIS C ANTIBODY INTERPRETATION: NORMAL
LYMPHOCYTES ABSOLUTE: 1.6 K/UL (ref 1–5.1)
LYMPHOCYTES RELATIVE PERCENT: 30.6 %
MAGNESIUM: 2 MG/DL (ref 1.8–2.4)
MCH RBC QN AUTO: 36 PG (ref 26–34)
MCHC RBC AUTO-ENTMCNC: 34.4 G/DL (ref 31–36)
MCV RBC AUTO: 104.8 FL (ref 80–100)
MONOCYTES ABSOLUTE: 0.5 K/UL (ref 0–1.3)
MONOCYTES RELATIVE PERCENT: 9.8 %
NEUTROPHILS ABSOLUTE: 2.9 K/UL (ref 1.7–7.7)
NEUTROPHILS RELATIVE PERCENT: 53.8 %
PDW BLD-RTO: 15.4 % (ref 12.4–15.4)
PLATELET # BLD: 212 K/UL (ref 135–450)
PMV BLD AUTO: 8.3 FL (ref 5–10.5)
POTASSIUM REFLEX MAGNESIUM: 3.4 MMOL/L (ref 3.5–5.1)
RBC # BLD: 3.22 M/UL (ref 4.2–5.9)
SODIUM BLD-SCNC: 135 MMOL/L (ref 136–145)
TOTAL PROTEIN: 5.3 G/DL (ref 6.4–8.2)
TRIGL SERPL-MCNC: 783 MG/DL (ref 0–150)
WBC # BLD: 5.3 K/UL (ref 4–11)

## 2020-07-29 PROCEDURE — 84478 ASSAY OF TRIGLYCERIDES: CPT

## 2020-07-29 PROCEDURE — A9576 INJ PROHANCE MULTIPACK: HCPCS | Performed by: INTERNAL MEDICINE

## 2020-07-29 PROCEDURE — 1200000000 HC SEMI PRIVATE

## 2020-07-29 PROCEDURE — 36415 COLL VENOUS BLD VENIPUNCTURE: CPT

## 2020-07-29 PROCEDURE — 80053 COMPREHEN METABOLIC PANEL: CPT

## 2020-07-29 PROCEDURE — 74183 MRI ABD W/O CNTR FLWD CNTR: CPT

## 2020-07-29 PROCEDURE — 2580000003 HC RX 258: Performed by: STUDENT IN AN ORGANIZED HEALTH CARE EDUCATION/TRAINING PROGRAM

## 2020-07-29 PROCEDURE — 6360000004 HC RX CONTRAST MEDICATION: Performed by: INTERNAL MEDICINE

## 2020-07-29 PROCEDURE — 85025 COMPLETE CBC W/AUTO DIFF WBC: CPT

## 2020-07-29 PROCEDURE — 83735 ASSAY OF MAGNESIUM: CPT

## 2020-07-29 PROCEDURE — 6370000000 HC RX 637 (ALT 250 FOR IP): Performed by: STUDENT IN AN ORGANIZED HEALTH CARE EDUCATION/TRAINING PROGRAM

## 2020-07-29 PROCEDURE — 6360000002 HC RX W HCPCS: Performed by: STUDENT IN AN ORGANIZED HEALTH CARE EDUCATION/TRAINING PROGRAM

## 2020-07-29 PROCEDURE — 99232 SBSQ HOSP IP/OBS MODERATE 35: CPT | Performed by: HOSPITALIST

## 2020-07-29 RX ORDER — POTASSIUM CHLORIDE 20 MEQ/1
20 TABLET, EXTENDED RELEASE ORAL ONCE
Status: COMPLETED | OUTPATIENT
Start: 2020-07-29 | End: 2020-07-29

## 2020-07-29 RX ADMIN — PANTOPRAZOLE SODIUM 40 MG: 40 TABLET, DELAYED RELEASE ORAL at 06:24

## 2020-07-29 RX ADMIN — SODIUM CHLORIDE: 9 INJECTION, SOLUTION INTRAVENOUS at 16:40

## 2020-07-29 RX ADMIN — ACETAMINOPHEN 650 MG: 325 TABLET ORAL at 16:42

## 2020-07-29 RX ADMIN — GADOTERIDOL 11 ML: 279.3 INJECTION, SOLUTION INTRAVENOUS at 16:10

## 2020-07-29 RX ADMIN — OMEGA-3-ACID ETHYL ESTERS 2 G: 1 CAPSULE, LIQUID FILLED ORAL at 20:23

## 2020-07-29 RX ADMIN — POTASSIUM CHLORIDE 20 MEQ: 20 TABLET, EXTENDED RELEASE ORAL at 09:01

## 2020-07-29 RX ADMIN — ENOXAPARIN SODIUM 40 MG: 40 INJECTION SUBCUTANEOUS at 08:51

## 2020-07-29 RX ADMIN — OMEGA-3-ACID ETHYL ESTERS 2 G: 1 CAPSULE, LIQUID FILLED ORAL at 08:51

## 2020-07-29 RX ADMIN — SODIUM CHLORIDE: 9 INJECTION, SOLUTION INTRAVENOUS at 06:24

## 2020-07-29 ASSESSMENT — PAIN DESCRIPTION - ONSET
ONSET: ON-GOING
ONSET: UNABLE TO TELL

## 2020-07-29 ASSESSMENT — PAIN - FUNCTIONAL ASSESSMENT
PAIN_FUNCTIONAL_ASSESSMENT: ACTIVITIES ARE NOT PREVENTED
PAIN_FUNCTIONAL_ASSESSMENT: ACTIVITIES ARE NOT PREVENTED

## 2020-07-29 ASSESSMENT — PAIN SCALES - GENERAL
PAINLEVEL_OUTOF10: 4
PAINLEVEL_OUTOF10: 0
PAINLEVEL_OUTOF10: 0
PAINLEVEL_OUTOF10: 2
PAINLEVEL_OUTOF10: 0
PAINLEVEL_OUTOF10: 0
PAINLEVEL_OUTOF10: 1
PAINLEVEL_OUTOF10: 0

## 2020-07-29 ASSESSMENT — PAIN DESCRIPTION - PAIN TYPE
TYPE: ACUTE PAIN
TYPE: ACUTE PAIN

## 2020-07-29 ASSESSMENT — PAIN DESCRIPTION - DESCRIPTORS
DESCRIPTORS: ACHING
DESCRIPTORS: ACHING

## 2020-07-29 ASSESSMENT — PAIN DESCRIPTION - FREQUENCY
FREQUENCY: CONTINUOUS
FREQUENCY: INTERMITTENT

## 2020-07-29 ASSESSMENT — PAIN DESCRIPTION - LOCATION
LOCATION: ABDOMEN
LOCATION: HEAD

## 2020-07-29 ASSESSMENT — PAIN DESCRIPTION - ORIENTATION: ORIENTATION: LOWER

## 2020-07-29 ASSESSMENT — PAIN DESCRIPTION - PROGRESSION
CLINICAL_PROGRESSION: NOT CHANGED
CLINICAL_PROGRESSION: NOT CHANGED

## 2020-07-29 NOTE — PLAN OF CARE
Nutrition Problem #1: Inadequate oral intake  Intervention: Food and/or Nutrient Delivery: Continue Current Diet, Start Oral Nutrition Supplement  Nutritional Goals: Pt will consume >/=50% of meals and ONS this admission

## 2020-07-29 NOTE — CONSULTS
[address] [place] [address]  GI Consultation      Patient: Cristin Pedersen  : 1960       Date:  2020    Subjective:       History of Present Illness  Patient is a 62 y.o. male admitted with Abdominal pain [R10.9]  Abdominal pain [R10.9] who is seen in consult for abdominal pain. He has had diffuse abdominal pain for several months with unintentional weight loss. CT findings  revealed diffuse hepatic steatosis and a small amount of fluid adjacent to the pancreatic tail. Complaint of right upper quadrant pain that becomes diffuse and then wraps around his back. Endorses nausea, denies vomiting and diarrhea. He drinks a significant amount of alcohol ~2 beers/day with more on weekends. Has drank up to 25 beers/day No history of pancreatitis. Past Medical History:   Diagnosis Date    Alcoholic ([address]     Arthritis     eft hand and bilateral knee    COPD (chronic obstructive pulmonary disease) ([address] 2014    Depression     Erectile dysfunction 2014    Seizure ([address] 2018    allergic reaction to bactrim    Wears glasses       Past Surgical History:   Procedure Laterality Date    COLECTOMY  2018    partial    HERNIA REPAIR      umbilical    LUMBAR FUSION Right 2020    FACETECTOMY L4-5 RIGHT, TRANSFORAMINAL LUMBAR INTERBODY AND FUSION WITH INTERBODY IMPLANT WITH RODS AND SCREW FIXATION L4-5 WITH C-ARM performed by Maryjo Pérez MD at [facility]      Past Endoscopic History  2018 Colonoscopy  2020 Flexible sigmoidoscopy- tortuous colon, mucosal nodule in the sigmoid colon. Admission Meds  No current facility-administered medications on file prior to encounter.       Current Outpatient Medications on File Prior to Encounter   Medication Sig Dispense Refill    acetaminophen (TYLENOL) 325 MG tablet Take 650 mg by mouth every 6 hours as needed for Pain      pantoprazole (PROTONIX) 40 MG tablet Take 1 tablet by mouth every morning (before breakfast) 90 tablet 1  Multiple Vitamins-Minerals (THERAPEUTIC MULTIVITAMIN-MINERALS) tablet Take 1 tablet by mouth daily      albuterol sulfate (PROAIR RESPICLICK) 151 (90 Base) MCG/ACT aerosol powder inhalation Inhale 2 puffs into the lungs every 4 hours as needed for Wheezing or Shortness of Breath 1 Inhaler 1        Allergies  Allergies   Allergen Reactions    Bactrim [Sulfamethoxazole-Trimethoprim] Shortness Of Breath     Pt is extremely allergic to this medication, can cause seizures. Social   Social History     Tobacco Use    Smoking status: Former Smoker     Packs/day: 2.00     Years: 40.00     Pack years: 80.00     Types: Cigarettes     Start date: 1965     Last attempt to quit: 2020     Years since quittin.4    Smokeless tobacco: Never Used   Substance Use Topics    Alcohol use: Yes     Alcohol/week: 35.0 standard drinks     Types: 35 Cans of beer per week     Comment: last drink 20        Family History   Problem Relation Age of Onset    Cancer Mother         ovarian     Cancer Father         throat    Cancer Brother         lung       Review of Systems  Gastrointestinal: positive for abdominal pain and nausea       Physical Exam    /73   Pulse 72   Temp 99.2 °F (37.3 °C) (Oral)   Resp 20   Ht 5' 9\" (1.753 m)   Wt 109 lb 14.4 oz (49.9 kg)   SpO2 95%   BMI 16.23 kg/m²   General appearance: alert, cooperative, no distress, appears stated age  Anicteric, No Jaundice  Head: Normocephalic, without obvious abnormality  Lungs: clear to auscultation bilaterally, Normal Effort  Heart: regular rate and rhythm, normal S1 and S2, no murmurs or rubs  Abdomen: abnormal findings:  distended, palpable liver. Extremities: atraumatic, no cyanosis or edema  Skin: warm and dry. Rash on the UE b/l.    Neuro: intact  AAOX3      Data Review:    Recent Labs     20  1317 20  0532   WBC 5.4 5.3   HGB 12.7* 11.6*   HCT 36.8* 33.7*   .7* 104.8*    212     Recent Labs 07/28/20  1317 07/29/20  0532    135*   K 3.8 3.4*   CL 98* 101   CO2 27 25   BUN 3* 2*   CREATININE <0.5* <0.5*     Recent Labs     07/28/20  1317 07/29/20  0532   * 127*   ALT 90* 68*   BILITOT 3.3* 2.5*   ALKPHOS 974* 795*     Recent Labs     07/28/20  1317   LIPASE 132.0*     Recent Labs     07/28/20  1317   PROTIME 11.4   INR 0.98     No results for input(s): PTT in the last 72 hours. No results for input(s): OCCULTBLD in the last 72 hours. Imaging Studies:  Ultrasound:   7/23/2020  Liver : Mild hepatomegaly (17.9 cm). Diffuse fatty infiltration of the liver. Gallbladder and Bile ducts : Although gallbladder wall is mildly thickened measuring 3.5 mm, the gallbladder is contracted. No sonographic evidence of acute cholecystitis. No biliary duct dilation. CT-scan of abdomen and pelvis:  7/28/2020  1.  No discrete findings within the abdomen or pelvis to explain patient's symptoms. 2.  Colonic diverticulosis without findings of diverticulitis. 3.  Diffuse hepatic steatosis. 4.  Small amount of fluid adjacent to the pancreatic tail and along the anterior left pararenal fascia of unclear significance or etiology.  Correlate clinically. Assessment:     Active Problems:    Abdominal pain    Elevated liver enzymes    Excessive drinking of alcohol    Mixed dyslipidemia    Severe malnutrition (HCC)  Resolved Problems:    * No resolved hospital problems. *    1. Fatty liver due to alcohol use  - Diffuse abdominal pain due to enlarged liver (capsular swelling), palpable on exam  - ALT 98  (7/29) has been down trending, improving  - CT 7/28 revealed diffuse hepatic steatosis      2. Pancreatitis 2/2 alcohol abuse vs. Hypertriglyceridemia  - History of alcohol abuse  - Triglycerides 1527  - Consider triglyceride management, however the increased value may be attributed to alcohol use as well as pancreatitis   - Lipase 132    3.  Possible bile duct obstruction  - Biliary

## 2020-07-29 NOTE — PROGRESS NOTES
deficits. Psychiatric:  Thought content appropriate, normal insight  Peripheral Pulses: +2 palpable, equal bilaterally     Labs:   Recent Labs     07/28/20  1317 07/29/20  0532   WBC 5.4 5.3   HGB 12.7* 11.6*   HCT 36.8* 33.7*    212     Recent Labs     07/28/20  1317 07/29/20  0532    135*   K 3.8 3.4*   CL 98* 101   CO2 27 25   BUN 3* 2*   CREATININE <0.5* <0.5*   CALCIUM 8.3 7.6*     Recent Labs     07/28/20  1317 07/29/20  0532   * 127*   ALT 90* 68*   BILITOT 3.3* 2.5*   ALKPHOS 974* 795*     Recent Labs     07/28/20  1317   INR 0.98     No results for input(s): CKTOTAL, TROPONINI in the last 72 hours. Urinalysis:      Lab Results   Component Value Date    NITRU Negative 02/14/2020    WBCUA 0 02/14/2020    RBCUA 0 02/14/2020    BLOODU Negative 02/14/2020    SPECGRAV 1.012 02/14/2020    GLUCOSEU Negative 02/14/2020       Radiology:  MRI ABDOMEN W WO CONTRAST MRCP   Final Result      Diffuse fatty infiltration of liver with the right hepatic lobe more affected than the left lobe. Focal acute interstitial pancreatitis at the pancreatic tail, better seen with recent CT. Normal caliber of the intra and extra hepatic biliary ducts without signs of duct encasement or choledocholithiasis      CT ABDOMEN PELVIS W IV CONTRAST Additional Contrast? None   Final Result      1. No discrete findings within the abdomen or pelvis to explain patient's symptoms. 2.  Colonic diverticulosis without findings of diverticulitis. 3.  Diffuse hepatic steatosis. 4.  Small amount of fluid adjacent to the pancreatic tail and along the anterior left pararenal fascia of unclear significance or etiology. Correlate clinically. 5.  No findings for nephro ureterolithiasis or obstructive uropathy. Small right renal cyst containing focal peripheral calcification. 6.  Prostatic hypertrophy.             Assessment/Plan:    Mr. Philip Nice is a 63 yo M with a PMH of COPD, HTN, and alcohol/tobacco abuse who presented with a several month-history of worsening abdominal pain and decreased appetite.      #Abdominal Pain - 2/2 suspected low-grade alcoholic hepatitis vs mild pancreatitis   CT: no acute findings, diffuse hepatic steatosis, small amt of fluid adj to pancreatic tail and along anterior left pararenal fascia. Lipase 132 (07/28). GGT 3199 (07/25). TGs elevated- trending down from 1527 (7/25) to 783 (7/29). . - Lovasa 2g BID  - MRCP performed- report pending  - Autoimmune hepatitis panel pending, DAVID positive  - Viral hepatitis panel negative  - Ammonia 6.1  - Gentle fluids  - Per GI - Recommends bowel rest, triglyceride management, EtOH cessation     #Severe Malnutrition - possibly 2/2 sustained heavy alcohol use  Risk Factors: alcohol abuse, pancreatitis, possible bile duct obstruction. Weight loss of 16% over 5 month period. Mild muscle wasting observed around clavicles on physical exam. Calcium 7.6, albumin 2.3.   - Dietary consulted - oral nutrition supplements added  - I&Os, weights    #Chronic Alcohol Abuse  Hx of 35 drinks/wk since age 27 yo. , ALT 68 (trending down). Hepatomegaly, petechiae on UE, edema on physical exam.  - CIWA w/o ativan    #Hypokalemia  K+ of 3.4 this morning.   - Repleted with Potassium Chloride 20mEq      #COPD  Smoked > 40 pack-years  - On duonebs    DVT Prophylaxis: Lovenox 40  Diet: Dietary Nutrition Supplements: Clear Liquid Oral Supplement  DIET CLEAR LIQUID;  Code Status: Limited    Discussed the patient with MD Vladimir Andrew MD  Internal Medicine Resident PGY-1  Contact via PerfectServe    Addendum to Resident H& P/Progress note:  I have personally seen,examined and evaluated the patient.  I have reviewed the current history, physical findings, labs and assessment and plan and agree with note as documented by resident MD ( )  Discussed the patient's condition with Dr Johnny Foreman, GI specialist    MRCP:  Diffuse fatty infiltration of liver with the right hepatic lobe more affected than the left lobe.         Focal acute interstitial pancreatitis at the pancreatic tail, better seen with recent CT.         Normal caliber of the intra and extra hepatic biliary ducts without signs of duct encasement or choledocholithiasis    The patient appears to have acute low grade alcoholic hepatitis and recent alcohol induced pancreatitis    Russell Guerrier MD, [address]

## 2020-07-29 NOTE — PROGRESS NOTES
RESPIRATORY THERAPY ASSESSMENT    Name:  Jenny Snowden Record Number:  [id number]  Age: 62 y.o. Gender: male  : 1960  Today's Date:  2020  Room:  6304/6304-01    Assessment     Is the patient being admitted for a COPD or Asthma exacerbation? No   (If yes the patient will be seen every 4 hours for the first 24 hours and then reassessed)    Patient Admission Diagnosis      Allergies  Allergies   Allergen Reactions    Bactrim [Sulfamethoxazole-Trimethoprim] Shortness Of Breath     Pt is extremely allergic to this medication, can cause seizures. Pulmonary History:COPD  Home Oxygen Therapy:  room air  Home Respiratory Therapy:Albuterol   Current Respiratory Therapy:  duoneb          Respiratory Severity Index(RSI)   Patients with orders for inhalation medications, oxygen, or any therapeutic treatment modality will be placed on Respiratory Protocol. They will be assessed with the first treatment and at least every 72 hours thereafter. The following severity scale will be used to determine frequency of treatment intervention. Smoking History: Pulmonary Disease or Smoking History, Greater than 15 pack year = 2    Social History  Social History     Tobacco Use    Smoking status: Former Smoker     Packs/day: 2.00     Years: 40.00     Pack years: 80.00     Types: Cigarettes     Start date: 1965     Last attempt to quit: 2020     Years since quittin.4    Smokeless tobacco: Never Used   Substance Use Topics    Alcohol use:  Yes     Alcohol/week: 35.0 standard drinks     Types: 35 Cans of beer per week     Comment: last drink 20    Drug use: Never       Recent Surgical History: None = 0  Past Surgical History  Past Surgical History:   Procedure Laterality Date    COLECTOMY  2018    partial    HERNIA REPAIR      umbilical    LUMBAR FUSION Right 2020    FACETECTOMY L4-5 RIGHT, TRANSFORAMINAL LUMBAR INTERBODY AND FUSION WITH INTERBODY IMPLANT WITH RODS AND SCREW FIXATION L4-5 WITH C-ARM performed by Radames Tan MD at [place] [address]       Level of Consciousness: Alert, Oriented, and Cooperative = 0    Level of Activity: Walking unassisted = 0    Respiratory Pattern: Regular Pattern; RR 8-20 = 0    Breath Sounds: Clear = 0    Sputum   ,  ,    Cough: Strong, spontaneous, non-productive = 0    Vital Signs   /82   Pulse 80   Temp 98.5 °F (36.9 °C) (Oral)   Resp 18   Ht 5' 9\" (1.753 m)   Wt 115 lb (52.2 kg)   SpO2 92%   BMI 16.98 kg/m²   SPO2 (COPD values may differ): Greater than or equal to 92% on room air = 0    Peak Flow (asthma only): not applicable = 0    RSI: 0-4 = See once and convert to home regimen or discontinue        Plan       Goals: medication delivery, mobilize retained secretions, volume expansion and improve oxygenation    Patient/caregiver was educated on the proper method of use for Respiratory Care Devices:  Yes      Level of patient/caregiver understanding able to:   ? Verbalize understanding   ? Demonstrate understanding       ? Teach back        ? Needs reinforcement       ? No available caregiver               ? Other:     Response to education:  Excellent     Is patient being placed on Home Treatment Regimen? Yes     Does the patient have everything they need prior to discharge? No     Comments: pt pw abdominal pain    Plan of Care: duoneb prn, oxygen protocol    Electronically signed by Kathryn Nguyen RCP on 7/28/2020 at 9:44 PM    Respiratory Protocol Guidelines     1. Assessment and treatment by Respiratory Therapy will be initiated for medication and therapeutic interventions upon initiation of aerosolized medication. 2. Physician will be contacted for respiratory rate (RR) greater than 35 breaths per minute. Therapy will be held for heart rate (HR) greater than 140 beats per minute, pending direction from physician.   3. Bronchodilators will be administered via Metered Dose Inhaler (MDI) with spacer when the following criteria are met:  a. Alert and cooperative     b. HR < 140 bpm  c. RR < 30 bpm                d. Can demonstrate a 2-3 second inspiratory hold  4. Bronchodilators will be administered via Hand Held Nebulizer L[facility]NA [facility]) to patients when ANY of the following criteria are met  a. Incognizant or uncooperative          b. Patients treated with HHN at Home        c. Unable to demonstrate proper use of MDI with spacer     d. RR > 30 bpm   5. Bronchodilators will be delivered via Metered Dose Inhaler (MDI), HHN, Aerogen to intubated patients on mechanical ventilation. 6. Inhalation medication orders will be delivered and/or substituted as outlined below. Aerosolized Medications Ordering and Administration Guidelines:    1. All Medications will be ordered by a physician, and their frequency and/or modality will be adjusted as defined by the patients Respiratory Severity Index (RSI) score. 2. If the patient does not have documented COPD, consider discontinuing anticholinergics when RSI is less than 9.  3. If the bronchospasm worsens (increased RSI), then the bronchodilator frequency can be increased to a maximum of every 4 hours. If greater than every 4 hours is required, the physician will be contacted. 4. If the bronchospasm improves, the frequency of the bronchodilator can be decreased, based on the patient's RSI, but not less than home treatment regimen frequency. 5. Bronchodilator(s) will be discontinued if patient has a RSI less than 9 and has received no scheduled or as needed treatment for 72  Hrs. Patients Ordered on a Mucolytic Agent:    1. Must always be administered with a bronchodilator. 2. Discontinue if patient experiences worsened bronchospasm, or secretions have lessened to the point that the patient is able to clear them with a cough. Anti-inflammatory and Combination Medications:    1.  If the patient lacks prior history of lung disease, is not using inhaled anti-inflammatory medication at home, and lacks wheezing by examination or by history for at least 24 hours, contact physician for possible discontinuation.

## 2020-07-29 NOTE — CARE COORDINATION
Case Management Assessment           Initial Evaluation                Date / Time of Evaluation: 7/29/2020 2:21 PM                 Assessment Completed by: Rancho Bach    Patient Name: Marzena Milton     YOB: 1960  Diagnosis: Abdominal pain [R10.9]  Abdominal pain [R10.9]     Date / Time: 7/28/2020 12:31 PM    Patient Admission Status: Inpatient    If patient is discharged prior to next notation, then this note serves as note for discharge by case management. Current PCP: David Ulloa MD  Clinic Patient: No    Chart Reviewed: Yes  Patient/ Family Interviewed: Yes    Initial assessment completed at bedside with: patient and spouse    Hospitalization in the last 30 days: No    Emergency Contacts:  Extended Emergency Contact Information  Primary Emergency Contact: Schoeny,Debbie  Address: [address] [place] [address], [address] [place] [address] Phone: [phone]  Relation: Spouse  Secondary Emergency Contact: Sara Meghan Phone: [phone]  Mobile Phone: [phone]  Relation: Brother/Sister    Advance Directives:   Code Status: [address]: No    Copy present: No     In paper Chart: No    Scanned into EMR No    Financial  Payor: BCBS / Plan: [address] [place] [address] / Product Type: *No Product type* /     Pre-cert required for SNF: Yes    [address] [phone]  [facility] [address]  Phone: [phone] Fax: [phone]    [facility][id number]- [facility]  [phone] - F [phone]  [address] [place] [address]  Phone: [phone] Fax: [phone]      Potential assistance Purchasing Medications: Potential Assistance Purchasing Medications: No  Does Patient want to participate in local refill/ meds to beds program?: No    Meds To Beds General Rules:  1.  Can ONLY be done Monday- Friday between 8:30am-5pm  2. Prescription(s) must be in pharmacy by 3pm to be filled same day  3. Copy of patient's insurance/ prescription drug card and patient face sheet must be sent along with the prescription(s)  4. Cost of Rx cannot be added to hospital bill. If financial assistance is needed, please contact unit  or ;  or  CANNOT provide pharmacy voucher for patients co-pays  5. Patients can then  the prescription on their way out of the hospital at discharge, or pharmacy can deliver to the bedside if staff is available. (payment due at time of pick-up or delivery - cash, check, or card accepted)     Able to afford home medications/ co-pay costs: Yes    ADLS  Support Systems: Family Members, Spouse/Significant Other, Children    PT AM-PAC:   /24  OT AM-PAC:   /24    [place]: House with spouse/mother-in-law  Steps:     Plans to RETURN to current housing: Yes  Barriers to RETURNING to current housing:     Marylin Moore 79  Currently ACTIVE with [address]: No  Home Care Agency: Not Applicable    Currently ACTIVE with [place] [facility]: No        Durable Medical Equipment  DME Provider:  Equipment: none    Home Oxygen and [address] [place] prior to admission: No      Dialysis  Active with HD/PD prior to admission: No      DISCHARGE PLAN:  Disposition: Home- No Services Needed    Transportation PLAN for discharge: family     Factors facilitating achievement of predicted outcomes: Family support    Barriers to discharge: Unrealistic expectations    Additional Case Management Notes:     SW met with patient and spouse at bedside this afternoon to complete assessment. Patient is a 63 yo male who was admitted for abdominal pain. Patient has a history of COPD, ETOH abuse and HTN. Patient lives at home ([address] 13 steps to go upstairs) with spouse and mother-in-law.  Patient spouse just completed 6 weeks of radiation for brain cancer radiation so patient decided \"to take care of me\". MIL has advanced dementia, spouse working on nursing home placement with [employer]. Patient states he stays on the first floor so he doesn't have to do the stairs, he is independent in ADL's and they share household tasks. Patient drives and does grocery shopping. Patient reports he has been drinking ETOH since he was 26years old; was drinking 6-8 beers a day but has cut it down to 2/day. Patient declines ETOH resource information stating he is going to have the bar owner start stocking non-alcoholic beer for him. Supportive  provided to both patient and spouse as they discussed both their health concerns. Patient would like to return home at discharge. SW to monitor and plan accordingly. ETOH resource list put on discharge instruction page. The Plan for Transition of Care is related to the following treatment goals of Abdominal pain [R10.9]  Abdominal pain [R10.9]    The Patient and/or patient representative Eran Carlson and his family were provided with a choice of provider and agrees with the discharge plan Yes    Freedom of choice list was provided with basic dialogue that supports the patient's individualized plan of care/goals and shares the quality data associated with the providers.  Yes    Care Transition patient: No    JAILYN Contreras, [facility]  Case Management Department  Ph: [phone]

## 2020-07-29 NOTE — PLAN OF CARE
Problem: Falls - Risk of:  Goal: Will remain free from falls  Description: Will remain free from falls  7/29/2020 1024 by Anali Quiroga RN  Outcome: Ongoing  Note: Patient is a fall risk. See Fall Risk assessment for details. Bed is in low, lock position; call light/belongings within reach. No attempts to get out of bed have been made, calls appropriately when assistance is needed. Bed alarm and hourly rounds in place for safety. Will continue to monitor and reassess throughout shift. Problem: Pain:  Goal: Pain level will decrease  Description: Pain level will decrease  Outcome: Ongoing  Note: Patient complaining of abdominal pain 3/10 in his RLQ. Tyelnol offered, patient declined. Encouraged to express needs and concerns with the call light and to ask questions about his plan of care. Patient reported pain after eating breakfast, encouraged to eat lighter foods to help with pain. Heat packs offered and denied. Will monitor.

## 2020-07-29 NOTE — PROGRESS NOTES
NUTRITION ASSESSMENT  Admission Date: 7/28/2020     Type and Reason for Visit: Initial, Positive Nutrition Screen    NUTRITION RECOMMENDATIONS:   1. PO Diet: Continue low fat diet   2. ONS: Start Ensure Clear BID   3. Encourage po intakes     NUTRITION ASSESSMENT:  Pt is malnourished AEB wt loss, poor po intakes, and low BMI. Pt admitted for abdominal pain and distention. H/o COPD, alcohol and tobacco abuse, and HTN. Pt reports that his intakes have been poor for a few months. Wt loss of 16% x 5 months per EMR. Pt placed on low fat diet at this time. Ordered ONS for additional nutrition support, pt drinks Boost at home. Encouraged intakes inbetween meals. Will monitor nutritional status this admission. Due to current CDC guidelines recommending 6-ft distancing for social isolation for COVID19 prevention, in lieu of NFPE this dietitian was able to visibly assess signs and symptoms of severe malnutrition, as indicated below. Pt with evidence of severe malnutrition per visual losses of fat and muscle, along with decreased energy intake and weight loss. MALNUTRITION ASSESSMENT  Context of Malnutrition: Acute Illness(on chronic r/t EtOH abuse)   Malnutrition Status: Severe malnutrition  Findings of the 6 clinical characteristics of malnutrition (Minimum of 2 out of 6 clinical characteristics is required to make the diagnosis of moderate or severe Protein Calorie Malnutrition based on AND/ASPEN Guidelines):  Energy Intake: Less than/equal to 75% of estimated energy requirements    Energy Intake Time: Greater than or equal to 3 months   Weight Loss %: 10% loss or greater    Weight loss Time: x5 months   Body Fat Loss: Unable to Assess   Body Fat Location: Unable to assess    Body Muscle Loss: Moderate Loss    Body Muscle Loss Location: Clavicles  (visually)  Fluid Accumulation: Mild (1+)   Fluid Accumulation Location: Generalized  (could be masking further wt loss)   Strength: Not Performed;  Not Measured NUTRITION DIAGNOSIS   Problem: Problem #1: Inadequate oral intake  Etiology: Insufficient energy/nutrient consumption  Signs & Symptoms: BMI, Diet history of poor intake  and Weight loss     NUTRITION INTERVENTION  Food and/or Nutrient Delivery:Continue Current diet  or Start ONS   Nutrition education/counseling/coordination of care: Continue Inpatient Monitoring     NUTRITION MONITORING & EVALUATION:  Evaluation:Goals set   Goals:Goals: Pt will consume >/=50% of meals and ONS this admission  Monitoring: GI Function , Meal Intake , Supplement Intake  or Weight      OBJECTIVE DATA:  · Nutrition-Focused Physical Findings: +1 BLE edema  · Wounds None      Past Medical History:   Diagnosis Date    Alcoholic ([facility]     Arthritis     eft hand and bilateral knee    COPD (chronic obstructive pulmonary disease) ([facility] 4/22/2014    Depression     Erectile dysfunction 4/22/2014    Seizure ([facility] 2018    allergic reaction to bactrim    Wears glasses         ANTHROPOMETRICS  Current Height: 5' 9\" (175.3 cm)  Current Weight: 109 lb 14.4 oz (49.9 kg)    Admission weight: 115 lb (52.2 kg)  Ideal Bodyweight 160 lb   Usual Bodyweight 130 lb per pt    Weight Changes -16 %, 21 lb x 5 months        BMI BMI (Calculated): 16.3    Wt Readings from Last 50 Encounters:   07/29/20 109 lb 14.4 oz (49.9 kg)   07/10/20 113 lb (51.3 kg)   02/27/20 130 lb 6.4 oz (59.1 kg)   02/21/20 121 lb 4.1 oz (55 kg)   02/11/20 122 lb (55.3 kg)     COMPARATIVE STANDARDS  Estimated Total Kcals/Day : 40-45 Current Bodyweight (50 kg) 4306-8401 kcal    Estimated Total Protein (g/day) : 1.5-2.0 Current Bodyweight (50 kg)  g/day  Estimated Daily Total Fluid (ml/day): 1 mL/kcal per day     Food / Nutrition-Related History  Pre-Admission / Home Diet:  Pre-Admission/Home Diet: General   Home Supplements / Herbals:    none noted  Food Restrictions / Cultural Requests:    none noted    Diet Orders / Intake / Nutrition Support  Current diet/supplement order: DIET LOW FAT;   Dietary Nutrition Supplements: Clear Liquid Oral Supplement     NSG Recorded PO:   PO Fluids P.O.: 240 mL  PO Meals PO Meals Eaten (%): 76 - 100%   PO Intake: %  PO Supplement: None   PO Supplement Intake: None   IVF: 100 ml/hr     NUTRITION RISK LEVEL: Risk Level: High     Shahab Bojorquez RD, TRISTAN  [place]:  [phone]  Office:  [phone]

## 2020-07-30 ENCOUNTER — TELEPHONE (OUTPATIENT)
Dept: INTERNAL MEDICINE CLINIC | Age: 60
End: 2020-07-30

## 2020-07-30 VITALS
OXYGEN SATURATION: 94 % | HEART RATE: 81 BPM | BODY MASS INDEX: 16.28 KG/M2 | RESPIRATION RATE: 18 BRPM | HEIGHT: 69 IN | SYSTOLIC BLOOD PRESSURE: 121 MMHG | TEMPERATURE: 98.7 F | DIASTOLIC BLOOD PRESSURE: 73 MMHG | WEIGHT: 109.9 LBS

## 2020-07-30 LAB
A/G RATIO: 0.7 (ref 1.1–2.2)
ALBUMIN SERPL-MCNC: 2.3 G/DL (ref 3.4–5)
ALP BLD-CCNC: 737 U/L (ref 40–129)
ALT SERPL-CCNC: 64 U/L (ref 10–40)
ANA INTERPRETATION: ABNORMAL
ANA PATTERN: ABNORMAL
ANA TITER: ABNORMAL
ANION GAP SERPL CALCULATED.3IONS-SCNC: 7 MMOL/L (ref 3–16)
ANTI-NUCLEAR ANTIBODY (ANA): POSITIVE
AST SERPL-CCNC: 97 U/L (ref 15–37)
BASOPHILS ABSOLUTE: 0.2 K/UL (ref 0–0.2)
BASOPHILS RELATIVE PERCENT: 2.5 %
BILIRUB SERPL-MCNC: 2.5 MG/DL (ref 0–1)
BUN BLDV-MCNC: <2 MG/DL (ref 7–20)
CALCIUM SERPL-MCNC: 7.8 MG/DL (ref 8.3–10.6)
CHLORIDE BLD-SCNC: 104 MMOL/L (ref 99–110)
CO2: 27 MMOL/L (ref 21–32)
CREAT SERPL-MCNC: <0.5 MG/DL (ref 0.9–1.3)
EOSINOPHILS ABSOLUTE: 0.1 K/UL (ref 0–0.6)
EOSINOPHILS RELATIVE PERCENT: 1.5 %
F-ACTIN AB IGG: 13 UNITS (ref 0–19)
GFR AFRICAN AMERICAN: >60
GFR NON-AFRICAN AMERICAN: >60
GLOBULIN: 3.1 G/DL
GLUCOSE BLD-MCNC: 93 MG/DL (ref 70–99)
HCT VFR BLD CALC: 35.6 % (ref 40.5–52.5)
HEMOGLOBIN: 12.2 G/DL (ref 13.5–17.5)
LYMPHOCYTES ABSOLUTE: 1.3 K/UL (ref 1–5.1)
LYMPHOCYTES RELATIVE PERCENT: 21.4 %
MCH RBC QN AUTO: 35.7 PG (ref 26–34)
MCHC RBC AUTO-ENTMCNC: 34.3 G/DL (ref 31–36)
MCV RBC AUTO: 104.1 FL (ref 80–100)
MITOCHONDRIAL M2 AB, IGG: 6.9 UNITS (ref 0–20)
MONOCYTES ABSOLUTE: 0.5 K/UL (ref 0–1.3)
MONOCYTES RELATIVE PERCENT: 8.8 %
NEUTROPHILS ABSOLUTE: 4.1 K/UL (ref 1.7–7.7)
NEUTROPHILS RELATIVE PERCENT: 65.8 %
PATHOLOGIST: ABNORMAL
PDW BLD-RTO: 15.8 % (ref 12.4–15.4)
PLATELET # BLD: 244 K/UL (ref 135–450)
PMV BLD AUTO: 8.2 FL (ref 5–10.5)
POTASSIUM REFLEX MAGNESIUM: 3.8 MMOL/L (ref 3.5–5.1)
RBC # BLD: 3.42 M/UL (ref 4.2–5.9)
SODIUM BLD-SCNC: 138 MMOL/L (ref 136–145)
TOTAL PROTEIN: 5.4 G/DL (ref 6.4–8.2)
TRIGL SERPL-MCNC: 491 MG/DL (ref 0–150)
WBC # BLD: 6.2 K/UL (ref 4–11)

## 2020-07-30 PROCEDURE — 85025 COMPLETE CBC W/AUTO DIFF WBC: CPT

## 2020-07-30 PROCEDURE — 84478 ASSAY OF TRIGLYCERIDES: CPT

## 2020-07-30 PROCEDURE — 99238 HOSP IP/OBS DSCHRG MGMT 30/<: CPT | Performed by: HOSPITALIST

## 2020-07-30 PROCEDURE — 6360000002 HC RX W HCPCS: Performed by: STUDENT IN AN ORGANIZED HEALTH CARE EDUCATION/TRAINING PROGRAM

## 2020-07-30 PROCEDURE — 80053 COMPREHEN METABOLIC PANEL: CPT

## 2020-07-30 PROCEDURE — 36415 COLL VENOUS BLD VENIPUNCTURE: CPT

## 2020-07-30 PROCEDURE — 6370000000 HC RX 637 (ALT 250 FOR IP): Performed by: STUDENT IN AN ORGANIZED HEALTH CARE EDUCATION/TRAINING PROGRAM

## 2020-07-30 PROCEDURE — 2580000003 HC RX 258: Performed by: STUDENT IN AN ORGANIZED HEALTH CARE EDUCATION/TRAINING PROGRAM

## 2020-07-30 RX ORDER — ICOSAPENT ETHYL 1000 MG/1
2 CAPSULE ORAL 2 TIMES DAILY
Qty: 120 CAPSULE | Refills: 0 | Status: SHIPPED | OUTPATIENT
Start: 2020-07-30 | End: 2020-07-31 | Stop reason: SDUPTHER

## 2020-07-30 RX ADMIN — SODIUM CHLORIDE: 9 INJECTION, SOLUTION INTRAVENOUS at 02:35

## 2020-07-30 RX ADMIN — OMEGA-3-ACID ETHYL ESTERS 2 G: 1 CAPSULE, LIQUID FILLED ORAL at 08:37

## 2020-07-30 RX ADMIN — ENOXAPARIN SODIUM 40 MG: 40 INJECTION SUBCUTANEOUS at 08:37

## 2020-07-30 RX ADMIN — PANTOPRAZOLE SODIUM 40 MG: 40 TABLET, DELAYED RELEASE ORAL at 06:50

## 2020-07-30 ASSESSMENT — PAIN SCALES - GENERAL
PAINLEVEL_OUTOF10: 0
PAINLEVEL_OUTOF10: 0

## 2020-07-30 NOTE — CARE COORDINATION
Case Management Assessment            Discharge Note                    Date / Time of Note: 7/30/2020 11:22 AM                  Discharge Note Completed by: Aliza Gallegos    Patient Name: Braeden Schroeder   YOB: 1960  Diagnosis: Abdominal pain [R10.9]  Abdominal pain [R10.9]   Date / Time: 7/28/2020 12:31 PM    Current PCP: Luci Palmer MD  Clinic patient: No    Hospitalization in the last 30 days: No    Advance Directives:  Code Status: [address] DNR form completed and on chart: Yes    Financial:  Payor: Pablo Ni / Plan: Toña Britton / Product Type: *No Product type* /      Pharmacy:    [facility] [address] [place] [address] [phone]  [address]  Phone: [phone] Fax: [phone]    [id number] [address] Milihafsa Omidnakul [phone] - F [phone]  [address] [place] [address]  Phone: [phone] Fax: [phone]      Assistance purchasing medications?: Potential Assistance Purchasing Medications: No  Assistance provided by Case Management: None at this time    Does patient want to participate in local refill/ meds to beds program?: No    Meds To Beds General Rules:  1. Can ONLY be done Monday- Friday between 8:30am-5pm  2. Prescription(s) must be in pharmacy by 3pm to be filled same day  3. Copy of patient's insurance/ prescription drug card and patient face sheet must be sent along with the prescription(s)  4. Cost of Rx cannot be added to hospital bill. If financial assistance is needed, please contact unit  or ;  or  CANNOT provide pharmacy voucher for patients co-pays  5.  Patients can then  the prescription on their way out of the hospital at discharge, or pharmacy can deliver to the bedside if staff is available. (payment due at time of pick-up or delivery - cash, check, or card accepted) that supports the patient's individualized plan of care/goals and shares the quality data associated with the providers.  Yes    Care Transitions patient: No    Sujatha Hardwick RN  [facility]  Case Management Department  Ph: [phone]

## 2020-07-30 NOTE — DISCHARGE SUMMARY
INTERNAL MEDICINE DEPARTMENT AT [address] [place] [address]  DISCHARGE SUMMARY    Patient ID: Savi Triana                                             Discharge Date: 7/30/2020   Patient's PCP: Karli Tim MD                                          Discharge Physician: Atul Ortiz MD  Admit Date: 7/28/2020   Admitting Physician: Nick Alanis MD    DISCHARGE DIAGNOSES:  1- Low-grade alcoholic hepatitis and mild pancreatitis   2 - Severe malnutrition - possibly 2/2 sustained heavy alcohol use  3- Chronic Alcohol Abuse  4- Hypokalemia  5- COPD  6. Dyslipidemia    Hospital Course:   Savi Triana is a 62 y.o. male w/ PMHX of COPD, alcohol and tobacco abuse, and HTN who presented to [facility], [facility]. with worsening periumbilical pain over the last several months. He complains of abdominal distension and discomfort, unintentional weight loss with poor appetite and poor energy for the last few months. He reports pain that is 2/10 today in intensity and is intermittent. He endorses drinking minimum of 8 beers a day since he was 27 yo. He also endorses a 40+ pack-year smoking history.     In the ED, he was found to have elevated AST of 166, ALT of 90, total bilirubin of 3.3, lipase of 132. CT scan today showed no acute findings, along with diffuse hepatic steatosis, and a small amount of fluid adjacent to the pancreatic tail and along the anterior left pararenal fascia. He was admitted to the [facility] for acute hepatitis and mild pancreatitis. GI was consulted and performed a MRCP to rule out obstruction; MRCP unremarkable. He was given Lovaza for his hypertriglyceridemia which improved from 1573 to 783. On discharged he was transitioned to [place]. Pt is being discharged in stable condition back home. He is to follow up with his PCP upon d/c and prior is to get a CBC, CMP, and lipid panel. Further he is prescribed Vasepa for his triglyceridemia and is to eat a low fat diet.     Physical Exam:  /73   Pulse 81   Temp 98.7 °F (37.1 °C) (Oral)   Resp 18   Ht 5' 9\" (1.753 m)   Wt 109 lb 14.4 oz (49.9 kg)   SpO2 94%   BMI 16.23 kg/m²   General appearance: alert and cooperative. Head: Normocephalic. Eyes: Mild scleral icterus. PERRL, EOM's intact. Ears: normal external ear. Nose: Nares normal.   Neck: no adenopathyno JVD, no tenderness/mass/nodules. Lungs: b/l expiratory wheezes L>R. Heart: regular rate and rhythm, no murmur, click, rub or gallop. Abdomen: RUQ tenderness; bowel sounds normal.Marked hepatomegaly. Extremities: pruritic rash on b/l arm. Neurologic: Grossly normal.    Consults: GI  Significant Diagnostic Studies:   MRI ABDOMEN W WO CONTRAST MRCP   Final Result      Diffuse fatty infiltration of liver with the right hepatic lobe more affected than the left lobe. Focal acute interstitial pancreatitis at the pancreatic tail, better seen with recent CT. Normal caliber of the intra and extra hepatic biliary ducts without signs of duct encasement or choledocholithiasis      CT ABDOMEN PELVIS W IV CONTRAST Additional Contrast? None   Final Result      1. No discrete findings within the abdomen or pelvis to explain patient's symptoms. 2.  Colonic diverticulosis without findings of diverticulitis. 3.  Diffuse hepatic steatosis. 4.  Small amount of fluid adjacent to the pancreatic tail and along the anterior left pararenal fascia of unclear significance or etiology. Correlate clinically. 5.  No findings for nephro ureterolithiasis or obstructive uropathy. Small right renal cyst containing focal peripheral calcification. 6.  Prostatic hypertrophy. Disposition: home  Discharged Condition: Stable  Follow Up: Primary Care Physician in 2 weeks and GI in 1 week.      DISCHARGE MEDICATION:     Medication List      START taking these medications    Vascepa 1 g Caps capsule  Generic drug:  Icosapent Ethyl  Take 2 capsules by mouth 2 times daily        CONTINUE taking these medications    albuterol sulfate 108 (90 Base) MCG/ACT aerosol powder inhalation  Commonly known as:  ProAir RespiClick  Inhale 2 puffs into the lungs every 4 hours as needed for Wheezing or Shortness of Breath     pantoprazole 40 MG tablet  Commonly known as:  PROTONIX  Take 1 tablet by mouth every morning (before breakfast)     therapeutic multivitamin-minerals tablet        STOP taking these medications    acetaminophen 325 MG tablet  Commonly known as:  TYLENOL           Where to Get Your Medications      You can get these medications from any pharmacy    Bring a paper prescription for each of these medications  · Vascepa 1 g Caps capsule         Diet: Low fat diet  Time Spent on discharge is more than 45 minutes    Signed:  Tadeo Ty MD PGY1  7/30/2020     Addendum to Resident H& P/Progress note:  I have personally seen,examined and evaluated the patient.  I have reviewed the current history, physical findings, labs and assessment and plan and agree with note as documented by resident MD ( Rosalinda Stevens)      Marcella Ivan MD, Lola Trotter

## 2020-07-30 NOTE — PROGRESS NOTES
[address] [place] [address]  GI Progress Note        Edna Ni is a 62 y.o. male patient. 1. Abdominal pain, unspecified abdominal location        Admit Date: 2020    Subjective:       Persistent RUQ pain, no leg edema noted. UE petechiae. Denied nausea, vomiting diarrhea. ROS:  Cardiovascular ROS: negative  Gastrointestinal ROS: RUQ pain  Respiratory ROS: negative    Scheduled Meds:   pantoprazole  40 mg Oral QAM AC    sodium chloride flush  10 mL Intravenous 2 times per day    enoxaparin  40 mg Subcutaneous Daily    sodium chloride flush  10 mL Intravenous 2 times per day    omega-3 acid ethyl esters  2 g Oral BID       Continuous Infusions:   sodium chloride 100 mL/hr at 20 0235       PRN Meds:  sodium chloride flush, acetaminophen **OR** acetaminophen, polyethylene glycol, promethazine **OR** ondansetron, sodium chloride flush, ipratropium-albuterol      Objective:       Patient Vitals for the past 24 hrs:   BP Temp Temp src Pulse Resp SpO2   20 0513 121/73 98.7 °F (37.1 °C) Oral 81 18 94 %   20 1945 133/79 98.1 °F (36.7 °C) Oral 73 18 95 %   20 1639 124/78 99.3 °F (37.4 °C) Oral 95 18 95 %       Exam:  VITALS:  /73   Pulse 81   Temp 98.7 °F (37.1 °C) (Oral)   Resp 18   Ht 5' 9\" (1.753 m)   Wt 109 lb 14.4 oz (49.9 kg)   SpO2 94%   BMI 16.23 kg/m²   TEMPERATURE:  Current - Temp: 98.7 °F (37.1 °C);  Max - Temp  Av.7 °F (37.1 °C)  Min: 98.1 °F (36.7 °C)  Max: 99.3 °F (37.4 °C)    NAD  General appearance: alert, appears stated age, cooperative and no distress  Head: Normocephalic, without obvious abnormality, atraumatic  Neck: supple, symmetrical, trachea midline and thyroid not enlarged, symmetric, no tenderness/mass/nodules  CVS:  RRR, Nl s1s2  Lungs CTA Bilaterally, normal effort  Abdomen: abnormal findings:  hepatomegaly, liver edge palpable  AAOx3, No asterixis or encephalopathy  Extremities: No edema, UE petechiae       Recent Labs 07/28/20 1317 07/29/20  0532 07/30/20  0500   WBC 5.4 5.3 6.2   HGB 12.7* 11.6* 12.2*   HCT 36.8* 33.7* 35.6*   .7* 104.8* 104.1*    212 244     Recent Labs     07/28/20 1317 07/29/20  0532 07/30/20  0500    135* 138   K 3.8 3.4* 3.8   CL 98* 101 104   CO2 27 25 27   BUN 3* 2* <2*   CREATININE <0.5* <0.5* <0.5*     Recent Labs     07/28/20 1317 07/29/20  0532 07/30/20  0500   * 127* 97*   ALT 90* 68* 64*   BILITOT 3.3* 2.5* 2.5*   ALKPHOS 974* 795* 737*     Recent Labs     07/28/20  1317   LIPASE 132.0*     Recent Labs     07/28/20 1317 07/29/20  0532 07/30/20  0500   PROT 6.2* 5.3* 5.4*   INR 0.98  --   --      No results for input(s): PTT in the last 72 hours. No results for input(s): OCCULTBLD in the last 72 hours. MRCP 7/29  Diffuse fatty infiltration of liver  Focal acute interstitial pancreatitis at the pancreatic tail  Normal caliber of the intra and extra hepatic biliary ducts without signs of duct encasement or choledocholithiasis         Assessment:       Active Problems:    Abdominal pain    Elevated liver enzymes    Excessive drinking of alcohol    Mixed dyslipidemia    Severe malnutrition (HCC)    Alcoholic hepatitis without ascites    Alcohol-induced acute pancreatitis without infection or necrosis    Hypokalemia  Resolved Problems:    * No resolved hospital problems.  *    Fatty liver- may be an infiltrative process    Recommendations:       - Alcohol cessation crucial  - Consider further triglyceride management  - Recommend follow up with Cameron/Abran Azevedo  7/30/2020  9:50 AM

## 2020-07-30 NOTE — PROGRESS NOTES
Discharge order received. Patient informed of discharge order. Discharge instructions reviewed with patient and wife. Copy of discharge instructions given to patient. Signed prescriptions for VASCEPA called in to [facility] per patient request. Paper Rx also given to patient. Patient and wife verbalized understanding, denies needs or questions at this time. IV removed. All patient belongings packed and sent with patient upon discharge. Patient left in private vehicle and transported home.

## 2020-07-30 NOTE — PLAN OF CARE
Problem: Falls - Risk of:  Goal: Will remain free from falls  Description: Will remain free from falls  7/29/2020 2145 by Giacomo Sheehan RN  Note: Patient is a medium fall risk. Fall prevention protocol in place at this time. Bed locked, to the lowest position with alarm on and sie rails up x2. Bed side table, call button and personal belongings within reach. Will continue to monitor and reassess. Problem: Pain:  Goal: Control of acute pain  Description: Control of acute pain  Note: No complaints of pain voiced per patient at this time. Will continue to monitor and reassess.

## 2020-07-31 ENCOUNTER — TELEPHONE (OUTPATIENT)
Dept: INTERNAL MEDICINE CLINIC | Age: 60
End: 2020-07-31

## 2020-07-31 LAB — LIVER-KIDNEY MICROSOME-1 AB IGG: 1.3 U (ref 0–24.9)

## 2020-07-31 RX ORDER — ICOSAPENT ETHYL 1000 MG/1
2 CAPSULE ORAL 2 TIMES DAILY
Qty: 28 CAPSULE | Refills: 0 | Status: SHIPPED | OUTPATIENT
Start: 2020-07-31 | End: 2020-08-10

## 2020-07-31 NOTE — TELEPHONE ENCOUNTER
[address] [phone] - F [phone]  Icosapent Ethyl (VASCEPA) 1 g CAPS    request sent to [place] department for  insurance  [place]   Please call 7 day supply to pharmacy, patient has been informed

## 2020-08-07 ENCOUNTER — HOSPITAL ENCOUNTER (OUTPATIENT)
Dept: GENERAL RADIOLOGY | Age: 60
Discharge: HOME | End: 2020-08-07
Payer: COMMERCIAL

## 2020-08-07 PROCEDURE — 72100 X-RAY EXAM L-S SPINE 2/3 VWS: CPT

## 2020-08-08 DIAGNOSIS — F10.10 EXCESSIVE DRINKING OF ALCOHOL: ICD-10-CM

## 2020-08-08 DIAGNOSIS — R74.8 ELEVATED LIVER ENZYMES: ICD-10-CM

## 2020-08-08 DIAGNOSIS — K70.10 ALCOHOLIC HEPATITIS WITHOUT ASCITES: ICD-10-CM

## 2020-08-08 LAB
A/G RATIO: 1 (ref 1.1–2.2)
ALBUMIN SERPL-MCNC: 3.4 G/DL (ref 3.4–5)
ALP BLD-CCNC: 359 U/L (ref 40–129)
ALT SERPL-CCNC: 42 U/L (ref 10–40)
ANION GAP SERPL CALCULATED.3IONS-SCNC: 11 MMOL/L (ref 3–16)
AST SERPL-CCNC: 43 U/L (ref 15–37)
BILIRUB SERPL-MCNC: 1.4 MG/DL (ref 0–1)
BUN BLDV-MCNC: 7 MG/DL (ref 7–20)
CALCIUM SERPL-MCNC: 9.2 MG/DL (ref 8.3–10.6)
CHLORIDE BLD-SCNC: 103 MMOL/L (ref 99–110)
CHOLESTEROL, TOTAL: 468 MG/DL (ref 0–199)
CO2: 25 MMOL/L (ref 21–32)
CREAT SERPL-MCNC: <0.5 MG/DL (ref 0.9–1.3)
GFR AFRICAN AMERICAN: >60
GFR NON-AFRICAN AMERICAN: >60
GLOBULIN: 3.3 G/DL
GLUCOSE BLD-MCNC: 124 MG/DL (ref 70–99)
HCT VFR BLD CALC: 40.7 % (ref 40.5–52.5)
HDLC SERPL-MCNC: 49 MG/DL (ref 40–60)
HEMOGLOBIN: 13.4 G/DL (ref 13.5–17.5)
LDL CHOLESTEROL CALCULATED: 370 MG/DL
MCH RBC QN AUTO: 35.8 PG (ref 26–34)
MCHC RBC AUTO-ENTMCNC: 32.9 G/DL (ref 31–36)
MCV RBC AUTO: 108.7 FL (ref 80–100)
PDW BLD-RTO: 16.2 % (ref 12.4–15.4)
PLATELET # BLD: 437 K/UL (ref 135–450)
PMV BLD AUTO: 8.3 FL (ref 5–10.5)
POTASSIUM SERPL-SCNC: 4.2 MMOL/L (ref 3.5–5.1)
RBC # BLD: 3.74 M/UL (ref 4.2–5.9)
SODIUM BLD-SCNC: 139 MMOL/L (ref 136–145)
TOTAL PROTEIN: 6.7 G/DL (ref 6.4–8.2)
TRIGL SERPL-MCNC: 246 MG/DL (ref 0–150)
VLDLC SERPL CALC-MCNC: 49 MG/DL
WBC # BLD: 6.7 K/UL (ref 4–11)

## 2020-08-10 ENCOUNTER — OFFICE VISIT (OUTPATIENT)
Dept: INTERNAL MEDICINE CLINIC | Age: 60
End: 2020-08-10
Payer: COMMERCIAL

## 2020-08-10 VITALS
WEIGHT: 116 LBS | SYSTOLIC BLOOD PRESSURE: 110 MMHG | DIASTOLIC BLOOD PRESSURE: 64 MMHG | BODY MASS INDEX: 17.13 KG/M2 | TEMPERATURE: 97.7 F

## 2020-08-10 PROCEDURE — 99214 OFFICE O/P EST MOD 30 MIN: CPT | Performed by: INTERNAL MEDICINE

## 2020-08-10 RX ORDER — ICOSAPENT ETHYL 1000 MG/1
CAPSULE ORAL
Qty: 28 CAPSULE | Refills: 0 | Status: SHIPPED | OUTPATIENT
Start: 2020-08-10 | End: 2020-08-28

## 2020-08-10 ASSESSMENT — ENCOUNTER SYMPTOMS
NAUSEA: 0
ABDOMINAL PAIN: 1
VOMITING: 0

## 2020-08-10 NOTE — PROGRESS NOTES
8/10/2020     Melina Churchill (:  1960) is a 62 y.o. male, here for evaluation of the following medical concerns:    Chief Complaint   Patient presents with    1 Month Follow-Up        HPI    He is feeling significantly better. Epigastric pain is nearly resolved. The pantoprazole has helped a lot with that. He has been trying to abstain from alcohol, last Thursday he drank some due to stress but otherwise has been good. He is also trying to adhere to a low-fat diet. He did see GI in follow-up. Review of Systems   Gastrointestinal: Positive for abdominal pain. Negative for nausea and vomiting. Prior to Visit Medications    Medication Sig Taking?  Authorizing Provider   pantoprazole (PROTONIX) 40 MG tablet Take 1 tablet by mouth every morning (before breakfast) Yes Maribel Cohn MD   Multiple Vitamins-Minerals (THERAPEUTIC MULTIVITAMIN-MINERALS) tablet Take 1 tablet by mouth daily Yes Historical Provider, MD   albuterol sulfate (PROAIR RESPICLICK) 188 (90 Base) MCG/ACT aerosol powder inhalation Inhale 2 puffs into the lungs every 4 hours as needed for Wheezing or Shortness of Breath Yes Maribel Cohn MD   VASCEPA 1 g CAPS capsule TAKE 2 CAPSULES BY MOUTH TWICE DAILY  Maribel Cohn MD        Past Medical History:   Diagnosis Date    Alcoholic ([address]     Arthritis     eft hand and bilateral knee    COPD (chronic obstructive pulmonary disease) ([address] 2014    Depression     Erectile dysfunction 2014    [facility] 2018    allergic reaction to bactrim    Wears glasses        Past Surgical History:   Procedure Laterality Date    COLECTOMY  2018    partial    HERNIA REPAIR      umbilical    LUMBAR FUSION Right 2020    FACETECTOMY L4-5 RIGHT, TRANSFORAMINAL LUMBAR INTERBODY AND FUSION WITH INTERBODY IMPLANT WITH RODS AND SCREW FIXATION L4-5 WITH C-ARM performed by Zenobia Leung MD at [address] [place] [address] History     Tobacco Use    Smoking status: Former Smoker Packs/day: 2.00     Years: 40.00     Pack years: 80.00     Types: Cigarettes     Start date: 1965     Last attempt to quit: 2020     Years since quittin.5    Smokeless tobacco: Never Used   Substance Use Topics    Alcohol use: Yes     Alcohol/week: 35.0 standard drinks     Types: 35 Cans of beer per week     Comment: last drink 20        Family History   Problem Relation Age of Onset    Cancer Mother         ovarian     Cancer Father         throat    Cancer Brother         lung       Vitals:    08/10/20 0938   BP: 110/64   Site: Left Upper Arm   Position: Sitting   Cuff Size: Medium Adult   Temp: 97.7 °F (36.5 °C)   TempSrc: Oral   Weight: 116 lb (52.6 kg)     Estimated body mass index is 17.13 kg/m² as calculated from the following:    Height as of 20: 5' 9\" (1.753 m). Weight as of this encounter: 116 lb (52.6 kg). Physical Exam  Vitals signs reviewed. Constitutional:       General: He is not in acute distress. Appearance: He is well-developed. HENT:      Head: Normocephalic and atraumatic. Cardiovascular:      Rate and Rhythm: Normal rate and regular rhythm. Heart sounds: Normal heart sounds. Pulmonary:      Effort: Pulmonary effort is normal. No respiratory distress. Breath sounds: Normal breath sounds. Abdominal:      General: Bowel sounds are normal.      Palpations: Abdomen is soft. Tenderness: There is abdominal tenderness (mild epigastric). Musculoskeletal:      Right lower leg: No edema. Left lower leg: No edema. Skin:     General: Skin is warm and dry. Neurological:      General: No focal deficit present. Mental Status: He is alert. Psychiatric:         Mood and Affect: Mood normal.         Behavior: Behavior normal.         Thought Content: Thought content normal.         Judgment: Judgment normal.         ASSESSMENT/PLAN:  1.  Epigastric pain  - significantly improved  - suspect there was element of gastritis as well as

## 2020-08-24 ENCOUNTER — TELEPHONE (OUTPATIENT)
Dept: INTERNAL MEDICINE CLINIC | Age: 60
End: 2020-08-24

## 2020-08-24 RX ORDER — ICOSAPENT ETHYL 1000 MG/1
CAPSULE ORAL
Qty: 28 CAPSULE | Refills: 0 | Status: CANCELLED | OUTPATIENT
Start: 2020-08-24

## 2020-08-28 RX ORDER — ICOSAPENT ETHYL 1000 MG/1
CAPSULE ORAL
Qty: 28 CAPSULE | Refills: 0 | Status: SHIPPED | OUTPATIENT
Start: 2020-08-28 | End: 2020-09-10

## 2020-09-10 RX ORDER — ICOSAPENT ETHYL 1000 MG/1
CAPSULE ORAL
Qty: 28 CAPSULE | Refills: 0 | Status: SHIPPED | OUTPATIENT
Start: 2020-09-10

## 2020-09-11 ENCOUNTER — TELEPHONE (OUTPATIENT)
Dept: INTERNAL MEDICINE CLINIC | Age: 60
End: 2020-09-11

## 2020-10-13 ENCOUNTER — OFFICE VISIT (OUTPATIENT)
Dept: INTERNAL MEDICINE CLINIC | Age: 60
End: 2020-10-13
Payer: COMMERCIAL

## 2020-10-13 VITALS
WEIGHT: 121.2 LBS | SYSTOLIC BLOOD PRESSURE: 118 MMHG | OXYGEN SATURATION: 98 % | TEMPERATURE: 96.9 F | HEIGHT: 69 IN | DIASTOLIC BLOOD PRESSURE: 64 MMHG | HEART RATE: 115 BPM | BODY MASS INDEX: 17.95 KG/M2

## 2020-10-13 PROCEDURE — 99214 OFFICE O/P EST MOD 30 MIN: CPT | Performed by: INTERNAL MEDICINE

## 2020-10-13 RX ORDER — TRAZODONE HYDROCHLORIDE 50 MG/1
50 TABLET ORAL NIGHTLY PRN
Qty: 30 TABLET | Refills: 0 | Status: SHIPPED | OUTPATIENT
Start: 2020-10-13 | End: 2020-11-10

## 2020-10-13 ASSESSMENT — ENCOUNTER SYMPTOMS: ABDOMINAL PAIN: 0

## 2020-10-13 NOTE — PROGRESS NOTES
10/13/2020     Yessenia Johnson (:  1960) is a 62 y.o. male, here for evaluation of the following medical concerns:    Chief Complaint   Patient presents with    Other     2 month follow up on digestion issue        HPI    Abdominal pain has resolved. He is taking pantoprazole 40 mg daily. He has gained some weight back. He has not quit alcohol entirely but has cut back significantly. Insurance never cover [employer], he has been taking 2/day. Denies nausea, pain. He has been dealing with a lot of stress at home. His wife is doing okay, she is done with radiation. Has some fluid on her brain, they are hoping to manage this with medications. He has some trouble sleeping. He can get sleep initially but wakes up after couple of hours and has trouble falling back to sleep. He does not drink alcohol in the evenings. Review of Systems   Constitutional: Negative for unexpected weight change. Gastrointestinal: Negative for abdominal pain. Psychiatric/Behavioral: Positive for sleep disturbance. Prior to Visit Medications    Medication Sig Taking?  Authorizing Provider   traZODone (DESYREL) 50 MG tablet Take 1 tablet by mouth nightly as needed for Sleep Yes Nikki Schulz MD   VASCEPA 1 g CAPS capsule TAKE 2 CAPSULES BY MOUTH TWICE DAILY Yes Nikki Schulz MD   pantoprazole (PROTONIX) 40 MG tablet Take 1 tablet by mouth every morning (before breakfast) Yes Nikki Schulz MD   Multiple Vitamins-Minerals (THERAPEUTIC MULTIVITAMIN-MINERALS) tablet Take 1 tablet by mouth daily Yes Historical Provider, MD   albuterol sulfate (PROAIR RESPICLICK) 440 (90 Base) MCG/ACT aerosol powder inhalation Inhale 2 puffs into the lungs every 4 hours as needed for Wheezing or Shortness of Breath Yes Nikki Schulz MD        Past Medical History:   Diagnosis Date    [facility]     Arthritis     eft hand and bilateral knee    COPD (chronic obstructive pulmonary disease) ([facility] 2014    Depression     Tenderness: There is no abdominal tenderness. Musculoskeletal:      Right lower leg: No edema. Left lower leg: No edema. Skin:     General: Skin is warm and dry. Neurological:      Mental Status: He is alert and oriented to person, place, and time. Psychiatric:         Behavior: Behavior normal.         Thought Content: Thought content normal.         Judgment: Judgment normal.         ASSESSMENT/PLAN:  1. Epigastric pain  - resolved  - continue pantoprazole 40mg daily for now, plan to decrease dose likely at next visit  - avoid alcohol    2. Mixed dyslipidemia  - improved, insurance has not covered Vascepa, fibrates are contraindicated  - CBC Auto Differential; Future  - Comprehensive Metabolic Panel; Future  - Lipid Panel; Future    3. Anemia, unspecified type  - reassess at next visit  - Folate; Future    4. Alcoholic hepatitis without ascites  - improved, encouraged abstinence    5. Stress  - with insomnia, will try trazodone 50mg nightly as needed      Return in about 3 months (around 1/13/2021).

## 2020-11-10 RX ORDER — TRAZODONE HYDROCHLORIDE 50 MG/1
TABLET ORAL
Qty: 30 TABLET | Refills: 0 | Status: SHIPPED | OUTPATIENT
Start: 2020-11-10 | End: 2020-12-09

## 2020-12-09 RX ORDER — TRAZODONE HYDROCHLORIDE 50 MG/1
TABLET ORAL
Qty: 30 TABLET | Refills: 1 | Status: SHIPPED | OUTPATIENT
Start: 2020-12-09 | End: 2021-02-05

## 2021-01-06 RX ORDER — PANTOPRAZOLE SODIUM 40 MG/1
TABLET, DELAYED RELEASE ORAL
Qty: 90 TABLET | Refills: 1 | Status: SHIPPED | OUTPATIENT
Start: 2021-01-06 | End: 2021-01-08 | Stop reason: SDUPTHER

## 2021-01-08 ENCOUNTER — TELEPHONE (OUTPATIENT)
Dept: INTERNAL MEDICINE CLINIC | Age: 61
End: 2021-01-08

## 2021-01-08 DIAGNOSIS — R10.13 EPIGASTRIC PAIN: Primary | ICD-10-CM

## 2021-01-08 RX ORDER — PANTOPRAZOLE SODIUM 40 MG/1
TABLET, DELAYED RELEASE ORAL
Qty: 90 TABLET | Refills: 1 | Status: SHIPPED | OUTPATIENT
Start: 2021-01-08 | End: 2021-07-06

## 2021-05-13 ENCOUNTER — TELEPHONE (OUTPATIENT)
Dept: INTERNAL MEDICINE CLINIC | Age: 61
End: 2021-05-13

## 2021-05-13 RX ORDER — DICLOFENAC SODIUM 75 MG/1
75 TABLET, DELAYED RELEASE ORAL 2 TIMES DAILY PRN
Qty: 60 TABLET | Refills: 0 | Status: SHIPPED | OUTPATIENT
Start: 2021-05-13 | End: 2021-06-09

## 2021-06-09 RX ORDER — DICLOFENAC SODIUM 75 MG/1
TABLET, DELAYED RELEASE ORAL
Qty: 60 TABLET | Refills: 0 | Status: SHIPPED | OUTPATIENT
Start: 2021-06-09 | End: 2021-07-09

## 2021-07-05 DIAGNOSIS — R10.13 EPIGASTRIC PAIN: ICD-10-CM

## 2021-07-06 RX ORDER — PANTOPRAZOLE SODIUM 40 MG/1
TABLET, DELAYED RELEASE ORAL
Qty: 90 TABLET | Refills: 1 | Status: SHIPPED | OUTPATIENT
Start: 2021-07-06

## 2021-07-09 RX ORDER — DICLOFENAC SODIUM 75 MG/1
TABLET, DELAYED RELEASE ORAL
Qty: 60 TABLET | Refills: 0 | Status: SHIPPED | OUTPATIENT
Start: 2021-07-09 | End: 2021-08-09

## 2021-08-09 RX ORDER — TRAZODONE HYDROCHLORIDE 50 MG/1
TABLET ORAL
Qty: 90 TABLET | Refills: 1 | Status: SHIPPED | OUTPATIENT
Start: 2021-08-09 | End: 2022-02-02

## 2021-08-09 RX ORDER — DICLOFENAC SODIUM 75 MG/1
TABLET, DELAYED RELEASE ORAL
Qty: 60 TABLET | Refills: 0 | Status: SHIPPED | OUTPATIENT
Start: 2021-08-09 | End: 2021-09-07

## 2021-09-21 NOTE — PROGRESS NOTES
Physician Progress Note      Georgi Humphrey  [facility] #:                  [id number]  :                       1960  ADMIT DATE:       2020 12:31 PM  [address] [place] DATE:  RESPONDING  PROVIDER #:        Crystal Stallings MD          QUERY TEXT:    Dear attending provider,    Patient admitted with pancreatitis 2/2 alcohol abuse vs Hypertriglyceridemia. Per dietary consult, this patient meets the ASPEN criteria for severe   malnutrition If possible, please document in progress notes under assessment   and plan and discharge summary if you are evaluating and /or treating any of   the following: The medical record reflects the following: severe malnutrition noted only in   active problem list w/o current date    Risk Factors: alcohol abuse, pancreatitis, possible bile duct obstruction  Clinical Indicators: Per dietary: Energy Intake: Less than/equal to 75% of   estimated energy requirements  Energy Intake Time: Greater than or equal to 3 months  Weight Loss %: 10% loss or greater  Weight loss Time: x5 months  Body Muscle Loss: Moderate Loss  Body Muscle Loss Location: Clavicles  (visually)  Fluid Accumulation: Mild (1+)  Fluid Accumulation Location: Generalized  (could be masking further wt loss)  Treatment: Dietary consult, I&Os, weights  Options provided:  -- Protein calorie malnutrition severe  -- Other - I will add my own diagnosis  -- Disagree - Clinically unable to determine / Unknown  -- Refer to Clinical Documentation Reviewer    PROVIDER RESPONSE TEXT:    This patient has severe protein calorie malnutrition.     Query created by: Shahab Stauffer on 2020 4:55 PM      Electronically signed by:  Crystal Stallings MD 2020 9:46 PM normal (ped)...

## 2021-12-06 RX ORDER — DICLOFENAC SODIUM 75 MG/1
TABLET, DELAYED RELEASE ORAL
Qty: 60 TABLET | Refills: 2 | Status: SHIPPED | OUTPATIENT
Start: 2021-12-06 | End: 2022-03-07

## 2022-02-02 RX ORDER — TRAZODONE HYDROCHLORIDE 50 MG/1
TABLET ORAL
Qty: 90 TABLET | Refills: 1 | Status: SHIPPED | OUTPATIENT
Start: 2022-02-02 | End: 2022-08-05

## 2022-03-07 RX ORDER — DICLOFENAC SODIUM 75 MG/1
TABLET, DELAYED RELEASE ORAL
Qty: 60 TABLET | Refills: 0 | Status: SHIPPED | OUTPATIENT
Start: 2022-03-07 | End: 2022-04-06

## 2022-04-06 RX ORDER — DICLOFENAC SODIUM 75 MG/1
TABLET, DELAYED RELEASE ORAL
Qty: 60 TABLET | Refills: 0 | Status: SHIPPED | OUTPATIENT
Start: 2022-04-06 | End: 2022-05-04

## 2022-05-04 RX ORDER — DICLOFENAC SODIUM 75 MG/1
TABLET, DELAYED RELEASE ORAL
Qty: 60 TABLET | Refills: 0 | Status: SHIPPED | OUTPATIENT
Start: 2022-05-04 | End: 2022-06-10

## 2022-06-10 RX ORDER — DICLOFENAC SODIUM 75 MG/1
TABLET, DELAYED RELEASE ORAL
Qty: 60 TABLET | Refills: 0 | Status: SHIPPED | OUTPATIENT
Start: 2022-06-10

## 2022-07-29 ENCOUNTER — OFFICE VISIT (OUTPATIENT)
Dept: INTERNAL MEDICINE CLINIC | Age: 62
End: 2022-07-29
Payer: COMMERCIAL

## 2022-07-29 VITALS
WEIGHT: 110 LBS | SYSTOLIC BLOOD PRESSURE: 110 MMHG | DIASTOLIC BLOOD PRESSURE: 68 MMHG | HEIGHT: 69 IN | BODY MASS INDEX: 16.29 KG/M2

## 2022-07-29 DIAGNOSIS — E78.2 MIXED DYSLIPIDEMIA: ICD-10-CM

## 2022-07-29 DIAGNOSIS — R20.0 NUMBNESS OF RIGHT FOOT: ICD-10-CM

## 2022-07-29 DIAGNOSIS — F17.210 CIGARETTE SMOKER: ICD-10-CM

## 2022-07-29 DIAGNOSIS — F10.10 EXCESSIVE DRINKING OF ALCOHOL: ICD-10-CM

## 2022-07-29 DIAGNOSIS — Z63.4 RECENT BEREAVEMENT: ICD-10-CM

## 2022-07-29 DIAGNOSIS — R20.0 NUMBNESS OF RIGHT FOOT: Primary | ICD-10-CM

## 2022-07-29 LAB
A/G RATIO: 1.9 (ref 1.1–2.2)
ALBUMIN SERPL-MCNC: 4.7 G/DL (ref 3.4–5)
ALP BLD-CCNC: 166 U/L (ref 40–129)
ALT SERPL-CCNC: 67 U/L (ref 10–40)
ANION GAP SERPL CALCULATED.3IONS-SCNC: 11 MMOL/L (ref 3–16)
AST SERPL-CCNC: 73 U/L (ref 15–37)
BASOPHILS ABSOLUTE: 0.1 K/UL (ref 0–0.2)
BASOPHILS RELATIVE PERCENT: 3.4 %
BILIRUB SERPL-MCNC: 0.4 MG/DL (ref 0–1)
BUN BLDV-MCNC: 5 MG/DL (ref 7–20)
CALCIUM SERPL-MCNC: 9 MG/DL (ref 8.3–10.6)
CHLORIDE BLD-SCNC: 100 MMOL/L (ref 99–110)
CHOLESTEROL, TOTAL: 229 MG/DL (ref 0–199)
CO2: 30 MMOL/L (ref 21–32)
CREAT SERPL-MCNC: <0.5 MG/DL (ref 0.8–1.3)
EOSINOPHILS ABSOLUTE: 0.1 K/UL (ref 0–0.6)
EOSINOPHILS RELATIVE PERCENT: 3.4 %
GFR AFRICAN AMERICAN: >60
GFR NON-AFRICAN AMERICAN: >60
GLUCOSE BLD-MCNC: 84 MG/DL (ref 70–99)
HCT VFR BLD CALC: 44.9 % (ref 40.5–52.5)
HDLC SERPL-MCNC: 88 MG/DL (ref 40–60)
HEMOGLOBIN: 15.3 G/DL (ref 13.5–17.5)
LDL CHOLESTEROL CALCULATED: 119 MG/DL
LYMPHOCYTES ABSOLUTE: 1.1 K/UL (ref 1–5.1)
LYMPHOCYTES RELATIVE PERCENT: 26.2 %
MCH RBC QN AUTO: 34.6 PG (ref 26–34)
MCHC RBC AUTO-ENTMCNC: 34 G/DL (ref 31–36)
MCV RBC AUTO: 101.6 FL (ref 80–100)
MONOCYTES ABSOLUTE: 0.5 K/UL (ref 0–1.3)
MONOCYTES RELATIVE PERCENT: 12.6 %
NEUTROPHILS ABSOLUTE: 2.2 K/UL (ref 1.7–7.7)
NEUTROPHILS RELATIVE PERCENT: 54.4 %
PDW BLD-RTO: 14.3 % (ref 12.4–15.4)
PLATELET # BLD: 230 K/UL (ref 135–450)
PMV BLD AUTO: 7.2 FL (ref 5–10.5)
POTASSIUM SERPL-SCNC: 4.7 MMOL/L (ref 3.5–5.1)
RBC # BLD: 4.42 M/UL (ref 4.2–5.9)
SODIUM BLD-SCNC: 141 MMOL/L (ref 136–145)
TOTAL PROTEIN: 7.2 G/DL (ref 6.4–8.2)
TRIGL SERPL-MCNC: 109 MG/DL (ref 0–150)
VLDLC SERPL CALC-MCNC: 22 MG/DL
WBC # BLD: 4.1 K/UL (ref 4–11)

## 2022-07-29 PROCEDURE — 99214 OFFICE O/P EST MOD 30 MIN: CPT | Performed by: INTERNAL MEDICINE

## 2022-07-29 RX ORDER — GABAPENTIN 100 MG/1
CAPSULE ORAL
Qty: 90 CAPSULE | Refills: 1 | Status: SHIPPED | OUTPATIENT
Start: 2022-07-29 | End: 2022-09-27

## 2022-07-29 SDOH — SOCIAL STABILITY - SOCIAL INSECURITY: DISSAPEARANCE AND DEATH OF FAMILY MEMBER: Z63.4

## 2022-07-29 SDOH — ECONOMIC STABILITY: FOOD INSECURITY: WITHIN THE PAST 12 MONTHS, THE FOOD YOU BOUGHT JUST DIDN'T LAST AND YOU DIDN'T HAVE MONEY TO GET MORE.: NEVER TRUE

## 2022-07-29 SDOH — ECONOMIC STABILITY: FOOD INSECURITY: WITHIN THE PAST 12 MONTHS, YOU WORRIED THAT YOUR FOOD WOULD RUN OUT BEFORE YOU GOT MONEY TO BUY MORE.: NEVER TRUE

## 2022-07-29 ASSESSMENT — PATIENT HEALTH QUESTIONNAIRE - PHQ9
2. FEELING DOWN, DEPRESSED OR HOPELESS: 1
SUM OF ALL RESPONSES TO PHQ QUESTIONS 1-9: 2
SUM OF ALL RESPONSES TO PHQ QUESTIONS 1-9: 2
SUM OF ALL RESPONSES TO PHQ9 QUESTIONS 1 & 2: 2
1. LITTLE INTEREST OR PLEASURE IN DOING THINGS: 1
SUM OF ALL RESPONSES TO PHQ QUESTIONS 1-9: 2
SUM OF ALL RESPONSES TO PHQ QUESTIONS 1-9: 2

## 2022-07-29 ASSESSMENT — SOCIAL DETERMINANTS OF HEALTH (SDOH): HOW HARD IS IT FOR YOU TO PAY FOR THE VERY BASICS LIKE FOOD, HOUSING, MEDICAL CARE, AND HEATING?: NOT HARD AT ALL

## 2022-07-29 NOTE — PROGRESS NOTES
Benitez Carlson (:  1960) is a 65 y.o. male,Established patient, here for evaluation of the following chief complaint(s): Foot Pain (Right foot, numb, sometimes has a tingling feeling, ongoing a few months get worse, no injuries )         ASSESSMENT/PLAN:  1. Numbness of right foot  -Distribution is not quite consistent with peripheral neuropathy but also not quite radicular, will start by checking B12 and folate. May need nerve conduction studies  -     Vitamin B12; Future  -     Folate; Future  2. Recent bereavement   -Continue trazodone as needed for sleep, on discussion with him it does sound like normal bereavement  3. Mixed dyslipidemia  -Continue Vascepa  -     Comprehensive Metabolic Panel; Future  -     Lipid Panel; Future  -     CBC with Auto Differential; Future  4. Cigarette smoker   -Not ready to work on quitting right now  5. Excessive drinking of alcohol   -He is aware that he needs to cut back, recheck liver enzymes    Return in about 2 months (around 2022) for numbness, etc.         Subjective   SUBJECTIVE/OBJECTIVE:  HPI    Right foot numbness - down the back of the leg and in the toes. If not wearing shoes, gets a shocking sensation between the first and second toes. No weakness that he has noticed, but first couple steps are more unsteady. He has been drinking multiple drinks a day, has been a little bit worse since his wife . His wife passed away 2 weeks ago from glioblastoma. He is having some trouble sleeping, but attributes it more to grief than anything else. Review of Systems       Objective   Physical Exam  Vitals reviewed. Constitutional:       General: He is not in acute distress. Appearance: Normal appearance. He is well-developed and underweight. HENT:      Head: Normocephalic and atraumatic. Cardiovascular:      Rate and Rhythm: Normal rate and regular rhythm. Heart sounds: Normal heart sounds.    Pulmonary:      Effort: Pulmonary effort is normal. No respiratory distress. Breath sounds: Normal breath sounds. Abdominal:      General: Abdomen is flat. Bowel sounds are normal. There is no distension. Palpations: There is no mass. Tenderness: There is no abdominal tenderness. Hernia: No hernia is present. Musculoskeletal:      Comments: No swelling or deformity of the right foot  No pain to pressure in the space between the right first and second metatarsals  Slight weakness in extension of the right great toe, otherwise strength appears normal in the right lower extremity   Skin:     General: Skin is warm and dry. Neurological:      Mental Status: He is alert. Comments: Numbness to light touch in the posterior part of the lower leg, starting around the lower part of the gastrocnemius. Some numbness to light touch on the plantar surface of the foot, more laterally but occurs throughout the foot. Less numbness on the top of the foot but does have some   Psychiatric:         Behavior: Behavior normal.         Thought Content: Thought content normal.         Judgment: Judgment normal.                An electronic signature was used to authenticate this note.     --Grabiel Alex MD

## 2022-07-30 LAB
FOLATE: 11.14 NG/ML (ref 4.78–24.2)
VITAMIN B-12: 448 PG/ML (ref 211–911)

## 2022-08-03 ENCOUNTER — TELEPHONE (OUTPATIENT)
Dept: INTERNAL MEDICINE CLINIC | Age: 62
End: 2022-08-03

## 2022-08-03 NOTE — TELEPHONE ENCOUNTER
I'm so sorry to hear that  We can just talk about it when I see him the next time, if he needs anything sooner than the appointment in September he should let me know

## 2022-08-03 NOTE — TELEPHONE ENCOUNTER
Spoke to pt gave results he wants to think about right now because his mother in law passed away this morning.      Once things settle he willnathalie back       Sent as sabino

## 2022-08-05 RX ORDER — TRAZODONE HYDROCHLORIDE 50 MG/1
TABLET ORAL
Qty: 90 TABLET | Refills: 1 | Status: SHIPPED | OUTPATIENT
Start: 2022-08-05

## 2022-11-27 RX ORDER — GABAPENTIN 100 MG/1
CAPSULE ORAL
Qty: 90 CAPSULE | Refills: 1 | Status: SHIPPED | OUTPATIENT
Start: 2022-11-27 | End: 2023-05-26

## 2022-12-27 ENCOUNTER — TELEPHONE (OUTPATIENT)
Dept: INTERNAL MEDICINE CLINIC | Age: 62
End: 2022-12-27

## 2022-12-27 NOTE — TELEPHONE ENCOUNTER
Pt states he has been feeling off balanced x 5 days. A little clumpsy. Other than that, pt does not know why he could be experiencing this. Requesting appt for next week (Week of 1/3/23). Informed pt Dr. Justin Sarmiento is booked until 1/12/23. Does not want to see another physician. Appt scheduled w/ Dr. Justin Sarmiento.

## 2023-01-12 ENCOUNTER — OFFICE VISIT (OUTPATIENT)
Dept: INTERNAL MEDICINE CLINIC | Age: 63
End: 2023-01-12
Payer: COMMERCIAL

## 2023-01-12 VITALS
BODY MASS INDEX: 16.29 KG/M2 | WEIGHT: 110 LBS | HEIGHT: 69 IN | SYSTOLIC BLOOD PRESSURE: 128 MMHG | DIASTOLIC BLOOD PRESSURE: 70 MMHG

## 2023-01-12 DIAGNOSIS — R27.0 ATAXIA: ICD-10-CM

## 2023-01-12 DIAGNOSIS — R74.8 ELEVATED LIVER ENZYMES: ICD-10-CM

## 2023-01-12 DIAGNOSIS — F41.9 ANXIETY: ICD-10-CM

## 2023-01-12 DIAGNOSIS — F17.210 CIGARETTE SMOKER: ICD-10-CM

## 2023-01-12 DIAGNOSIS — R25.1 TREMOR: Primary | ICD-10-CM

## 2023-01-12 LAB
A/G RATIO: 1.6 (ref 1.1–2.2)
ALBUMIN SERPL-MCNC: 4.2 G/DL (ref 3.4–5)
ALP BLD-CCNC: 162 U/L (ref 40–129)
ALT SERPL-CCNC: 36 U/L (ref 10–40)
ANION GAP SERPL CALCULATED.3IONS-SCNC: 17 MMOL/L (ref 3–16)
AST SERPL-CCNC: 44 U/L (ref 15–37)
BASOPHILS ABSOLUTE: 0.1 K/UL (ref 0–0.2)
BASOPHILS RELATIVE PERCENT: 1.2 %
BILIRUB SERPL-MCNC: 0.5 MG/DL (ref 0–1)
BUN BLDV-MCNC: 4 MG/DL (ref 7–20)
CALCIUM SERPL-MCNC: 9 MG/DL (ref 8.3–10.6)
CHLORIDE BLD-SCNC: 94 MMOL/L (ref 99–110)
CO2: 25 MMOL/L (ref 21–32)
CREAT SERPL-MCNC: <0.5 MG/DL (ref 0.8–1.3)
EOSINOPHILS ABSOLUTE: 0 K/UL (ref 0–0.6)
EOSINOPHILS RELATIVE PERCENT: 0.5 %
GFR SERPL CREATININE-BSD FRML MDRD: >60 ML/MIN/{1.73_M2}
GLUCOSE BLD-MCNC: 87 MG/DL (ref 70–99)
HCT VFR BLD CALC: 48.9 % (ref 40.5–52.5)
HEMOGLOBIN: 16.1 G/DL (ref 13.5–17.5)
LYMPHOCYTES ABSOLUTE: 0.9 K/UL (ref 1–5.1)
LYMPHOCYTES RELATIVE PERCENT: 13.1 %
MCH RBC QN AUTO: 33.2 PG (ref 26–34)
MCHC RBC AUTO-ENTMCNC: 32.9 G/DL (ref 31–36)
MCV RBC AUTO: 100.8 FL (ref 80–100)
MONOCYTES ABSOLUTE: 0.6 K/UL (ref 0–1.3)
MONOCYTES RELATIVE PERCENT: 8.9 %
NEUTROPHILS ABSOLUTE: 5.3 K/UL (ref 1.7–7.7)
NEUTROPHILS RELATIVE PERCENT: 76.3 %
PDW BLD-RTO: 12.8 % (ref 12.4–15.4)
PLATELET # BLD: 251 K/UL (ref 135–450)
PMV BLD AUTO: 8.2 FL (ref 5–10.5)
POTASSIUM SERPL-SCNC: 4.9 MMOL/L (ref 3.5–5.1)
RBC # BLD: 4.85 M/UL (ref 4.2–5.9)
SODIUM BLD-SCNC: 136 MMOL/L (ref 136–145)
TOTAL PROTEIN: 6.9 G/DL (ref 6.4–8.2)
WBC # BLD: 6.9 K/UL (ref 4–11)

## 2023-01-12 PROCEDURE — 99214 OFFICE O/P EST MOD 30 MIN: CPT | Performed by: INTERNAL MEDICINE

## 2023-01-12 ASSESSMENT — PATIENT HEALTH QUESTIONNAIRE - PHQ9
1. LITTLE INTEREST OR PLEASURE IN DOING THINGS: 0
SUM OF ALL RESPONSES TO PHQ QUESTIONS 1-9: 0
SUM OF ALL RESPONSES TO PHQ QUESTIONS 1-9: 0
SUM OF ALL RESPONSES TO PHQ9 QUESTIONS 1 & 2: 0
SUM OF ALL RESPONSES TO PHQ QUESTIONS 1-9: 0
2. FEELING DOWN, DEPRESSED OR HOPELESS: 0
SUM OF ALL RESPONSES TO PHQ QUESTIONS 1-9: 0

## 2023-01-12 NOTE — PROGRESS NOTES
Arlene Garsia (:  1960) is a 65 y.o. male,Established patient, here for evaluation of the following chief complaint(s): Other (Off balance )         ASSESSMENT/PLAN:  1. Tremor  -Suspect alcohol toxicity, need to rule out central process  -     MRI BRAIN W WO CONTRAST; Future  2. Ataxia  -     MRI BRAIN W WO CONTRAST; Future  3. Elevated liver enzymes  -Alcohol-related, discussed that this is my biggest concern with the drinking recommend cessation. At this point I do not think he is ready to stop drinking, however we did discuss that when he is ready, he will need to have his anxiety addressed at the same time. Likely would have alcohol withdrawal so will need support when he is ready to quit. -     Comprehensive Metabolic Panel; Future  -     CBC with Auto Differential; Future  4. Cigarette smoker   -Discussed lung cancer screening, he would be a candidate. He will think about it, gave him a handout with the information. 5. Anxiety   -As above, discussed the alcohol use    Return in about 1 month (around 2023) for tremors. Subjective   SUBJECTIVE/OBJECTIVE:  HPI    He has been noticing worsening tremor. Having more trouble with balance. When he is in the shower, if he closes his eyes he will fall over. He continues to drink over 6 beers a day. The numbness in his right foot and lower leg has not progressed, though still present. No vertigo. He denies headaches, vision changes. He struggles with anxiety, and the anxiety and he feels better when he is drinking. Review of Systems   Eyes:  Negative for visual disturbance. Respiratory:  Positive for cough. Neurological:  Positive for dizziness and tremors. Negative for headaches. Objective   Physical Exam  Vitals reviewed. Constitutional:       General: He is not in acute distress. Appearance: Normal appearance. He is well-developed and underweight. HENT:      Head: Normocephalic and atraumatic.    Eyes: Extraocular Movements:      Right eye: Normal extraocular motion and no nystagmus. Left eye: Normal extraocular motion and no nystagmus. Pulmonary:      Effort: Pulmonary effort is normal. No respiratory distress. Skin:     General: Skin is warm and dry. Neurological:      Mental Status: He is alert. Motor: Tremor present. Gait: Gait abnormal (wide based, ataxic). Comments: Coarse intention tremor   Psychiatric:         Behavior: Behavior normal.         Thought Content: Thought content normal.         Judgment: Judgment normal.                An electronic signature was used to authenticate this note.     --Angie Joseph MD

## 2023-01-13 ASSESSMENT — ENCOUNTER SYMPTOMS: COUGH: 1

## 2023-01-20 ENCOUNTER — HOSPITAL ENCOUNTER (OUTPATIENT)
Dept: MRI IMAGING | Age: 63
Discharge: HOME | End: 2023-01-20
Payer: COMMERCIAL

## 2023-01-20 DIAGNOSIS — R27.0 ATAXIA: ICD-10-CM

## 2023-01-20 DIAGNOSIS — R25.1 TREMOR: ICD-10-CM

## 2023-01-20 PROCEDURE — 70553 MRI BRAIN STEM W/O & W/DYE: CPT

## 2023-01-20 PROCEDURE — A9576 INJ PROHANCE MULTIPACK: HCPCS | Performed by: INTERNAL MEDICINE

## 2023-01-20 PROCEDURE — 6360000004 HC RX CONTRAST MEDICATION: Performed by: INTERNAL MEDICINE

## 2023-01-20 RX ADMIN — GADOTERIDOL 14 ML: 279.3 INJECTION, SOLUTION INTRAVENOUS at 16:55

## 2023-05-18 ENCOUNTER — OFFICE VISIT (OUTPATIENT)
Dept: INTERNAL MEDICINE CLINIC | Age: 63
End: 2023-05-18
Payer: COMMERCIAL

## 2023-05-18 VITALS
TEMPERATURE: 97.9 F | BODY MASS INDEX: 17.61 KG/M2 | HEART RATE: 103 BPM | OXYGEN SATURATION: 90 % | WEIGHT: 112.2 LBS | DIASTOLIC BLOOD PRESSURE: 74 MMHG | SYSTOLIC BLOOD PRESSURE: 120 MMHG | HEIGHT: 67 IN

## 2023-05-18 DIAGNOSIS — R10.13 EPIGASTRIC PAIN: ICD-10-CM

## 2023-05-18 DIAGNOSIS — F10.10 EXCESSIVE DRINKING OF ALCOHOL: ICD-10-CM

## 2023-05-18 DIAGNOSIS — R25.1 TREMOR: Primary | ICD-10-CM

## 2023-05-18 PROCEDURE — 99214 OFFICE O/P EST MOD 30 MIN: CPT | Performed by: INTERNAL MEDICINE

## 2023-05-18 RX ORDER — TRAZODONE HYDROCHLORIDE 50 MG/1
TABLET ORAL
Qty: 90 TABLET | Refills: 1 | Status: SHIPPED | OUTPATIENT
Start: 2023-05-18

## 2023-05-18 RX ORDER — PANTOPRAZOLE SODIUM 40 MG/1
40 TABLET, DELAYED RELEASE ORAL
Qty: 90 TABLET | Refills: 1 | Status: SHIPPED | OUTPATIENT
Start: 2023-05-18

## 2023-05-18 RX ORDER — DICLOFENAC SODIUM 75 MG/1
TABLET, DELAYED RELEASE ORAL
Qty: 180 TABLET | Refills: 1 | Status: SHIPPED | OUTPATIENT
Start: 2023-05-18

## 2023-05-18 RX ORDER — LANOLIN ALCOHOL/MO/W.PET/CERES
100 CREAM (GRAM) TOPICAL DAILY
Qty: 90 TABLET | Refills: 1 | Status: SHIPPED | OUTPATIENT
Start: 2023-05-18

## 2023-05-18 RX ORDER — GABAPENTIN 100 MG/1
CAPSULE ORAL
Qty: 90 CAPSULE | Refills: 1 | Status: SHIPPED | OUTPATIENT
Start: 2023-05-18 | End: 2023-11-14

## 2023-05-18 RX ORDER — LANOLIN ALCOHOL/MO/W.PET/CERES
400 CREAM (GRAM) TOPICAL DAILY
Qty: 90 TABLET | Refills: 1 | Status: SHIPPED | OUTPATIENT
Start: 2023-05-18

## 2023-05-18 SDOH — ECONOMIC STABILITY: FOOD INSECURITY: WITHIN THE PAST 12 MONTHS, YOU WORRIED THAT YOUR FOOD WOULD RUN OUT BEFORE YOU GOT MONEY TO BUY MORE.: NEVER TRUE

## 2023-05-18 SDOH — ECONOMIC STABILITY: INCOME INSECURITY: HOW HARD IS IT FOR YOU TO PAY FOR THE VERY BASICS LIKE FOOD, HOUSING, MEDICAL CARE, AND HEATING?: NOT HARD AT ALL

## 2023-05-18 SDOH — ECONOMIC STABILITY: HOUSING INSECURITY
IN THE LAST 12 MONTHS, WAS THERE A TIME WHEN YOU DID NOT HAVE A STEADY PLACE TO SLEEP OR SLEPT IN A SHELTER (INCLUDING NOW)?: NO

## 2023-05-18 SDOH — ECONOMIC STABILITY: FOOD INSECURITY: WITHIN THE PAST 12 MONTHS, THE FOOD YOU BOUGHT JUST DIDN'T LAST AND YOU DIDN'T HAVE MONEY TO GET MORE.: NEVER TRUE

## 2023-05-18 NOTE — PROGRESS NOTES
Morgan Valencia (:  1960) is a 65 y.o. male,Established patient, here for evaluation of the following chief complaint(s):  Hyperlipidemia and Fall (Feels off balance and falls frequently last week, fell outside and at home )         ASSESSMENT/PLAN:  1. Tremor  -As well as neuropathy. MRI was normal.  I suspect all of this is alcohol related, I would like him to see a neurologist to rule out any other cause. -     Danielle Lopez MD, Neurology, [facility]  2. Excessive drinking of alcohol   -Not ready to quit. Start thiamine and folate supplementation. 3. Epigastric pain  -Likely alcohol-related, refill pantoprazole  -     pantoprazole (PROTONIX) 40 MG tablet; Take 1 tablet by mouth every morning (before breakfast), Disp-90 tablet, R-1Normal      Return in about 3 months (around 2023) for tremor, neuropathy, weight. Subjective   SUBJECTIVE/OBJECTIVE:  HPI    He continues to have difficulty with tremor and balance. He still has numbness in the right foot. He has had falls, he has been very careful when he gets up. He continues to drink alcohol, he has cut back to about 6 drinks per day. Anxiety remains high. He is taking the pantoprazole regularly, no epigastric pain. He is looking into applying for disability. Review of Systems       Objective   Physical Exam  Vitals reviewed. Constitutional:       General: He is not in acute distress. Appearance: Normal appearance. He is well-developed and underweight. HENT:      Head: Normocephalic and atraumatic. Cardiovascular:      Rate and Rhythm: Normal rate and regular rhythm. Heart sounds: Normal heart sounds. Pulmonary:      Effort: Pulmonary effort is normal. No respiratory distress. Breath sounds: Normal breath sounds. Skin:     General: Skin is warm and dry. Neurological:      Mental Status: He is alert.       Comments: Intention tremor  Ataxia most notable when first getting up from chair, gait

## 2023-08-18 ENCOUNTER — OFFICE VISIT (OUTPATIENT)
Dept: INTERNAL MEDICINE CLINIC | Age: 63
End: 2023-08-18
Payer: COMMERCIAL

## 2023-08-18 VITALS
WEIGHT: 104 LBS | DIASTOLIC BLOOD PRESSURE: 64 MMHG | BODY MASS INDEX: 15.4 KG/M2 | HEIGHT: 69 IN | SYSTOLIC BLOOD PRESSURE: 110 MMHG

## 2023-08-18 DIAGNOSIS — R25.1 TREMOR: Primary | ICD-10-CM

## 2023-08-18 DIAGNOSIS — R27.0 ATAXIA: ICD-10-CM

## 2023-08-18 DIAGNOSIS — F32.A DEPRESSION, UNSPECIFIED DEPRESSION TYPE: ICD-10-CM

## 2023-08-18 DIAGNOSIS — F10.10 ALCOHOL ABUSE: ICD-10-CM

## 2023-08-18 PROCEDURE — 99214 OFFICE O/P EST MOD 30 MIN: CPT | Performed by: INTERNAL MEDICINE

## 2023-08-18 RX ORDER — MIRTAZAPINE 15 MG/1
15 TABLET, FILM COATED ORAL NIGHTLY
Qty: 90 TABLET | Refills: 1 | Status: SHIPPED | OUTPATIENT
Start: 2023-08-18

## 2023-08-18 NOTE — PROGRESS NOTES
Yaya Brody (:  1960) is a 59 y.o. male,Established patient, here for evaluation of the following chief complaint(s): Other (Follow up for pain )         ASSESSMENT/PLAN:  1. Tremor   -Likely alcohol related. At this point he is not ready to quit, offered support  2. Ataxia   -Also likely toxicity from alcohol over time. 3. Depression, unspecified depression type   -We will try changing trazodone for sleep to mirtazapine 15 mg nightly, hopefully will help with weight, mood, and sleep  4. Alcohol abuse   -Not ready to quit, support offered    Return in about 3 months (around 2023) for sleep, mood, pain. Subjective   SUBJECTIVE/OBJECTIVE:  HPI    Symptoms are about the same. He still has trouble sleeping. He is drinking alcohol, \"too much\". He still has a tremor and has trouble walking. Sometimes he needs to walk with a walker. The other day symptoms were worse and he was not able to go to the grocery store, he had to send a friend. Other days are better, today is not as bad. He has not seen the neurologist but has the referral information. The stomach has been fine. Nerve pain bothers him on and off. He takes the pain medications at night and it helps a bit. He has not been taking the trazodone regularly. Review of Systems       Objective   Physical Exam  Vitals reviewed. Constitutional:       General: He is not in acute distress. Appearance: Normal appearance. He is well-developed. HENT:      Head: Normocephalic and atraumatic. Cardiovascular:      Rate and Rhythm: Normal rate and regular rhythm. Heart sounds: Normal heart sounds. Pulmonary:      Effort: Pulmonary effort is normal. No respiratory distress. Breath sounds: Normal breath sounds. Skin:     General: Skin is warm and dry. Neurological:      Mental Status: He is alert. Motor: Tremor present. Psychiatric:         Behavior: Behavior normal.         Thought Content:  Thought content

## 2023-10-20 ENCOUNTER — TELEPHONE (OUTPATIENT)
Dept: INTERNAL MEDICINE CLINIC | Age: 63
End: 2023-10-20

## 2023-10-20 NOTE — TELEPHONE ENCOUNTER
Spoke with sister, he was found at home on the floor, still waiting for reports to come back. Expressed my condolences.

## 2023-10-20 NOTE — TELEPHONE ENCOUNTER
Patient's sister called in wanting to inform Dr. Savanah Jarrett that the patient passed away on 10/2/23      Pls advise.

## (undated) DEVICE — SUTURE VCRL SZ 0 L18IN ABSRB UD L36MM CT-1 1/2 CIR J840D

## (undated) DEVICE — INTENDED FOR TISSUE SEPARATION, AND OTHER PROCEDURES THAT REQUIRE A SHARP SURGICAL BLADE TO PUNCTURE OR CUT.: Brand: BARD-PARKER ® STAINLESS STEEL BLADES

## (undated) DEVICE — LOCATOR RAD PASS SPHR MRK NAVIGATION BALL DISP STLTH STN

## (undated) DEVICE — TOTAL TRAY, 16FR 10ML SIL FOLEY, URN: Brand: MEDLINE

## (undated) DEVICE — Device

## (undated) DEVICE — DRAPE C ARM UNIV W41XL74IN CLR PLAS XR VELC CLSR POLY STRP

## (undated) DEVICE — COVER,TABLE,77X90,STERILE: Brand: MEDLINE

## (undated) DEVICE — SHEET, T, LAPAROTOMY, STERILE: Brand: MEDLINE

## (undated) DEVICE — PAD,NON-ADHERENT,3X8,STERILE,LF,1/PK: Brand: MEDLINE

## (undated) DEVICE — ELECTRODE PT RET AD L9FT HI MOIST COND ADH HYDRGEL CORDED

## (undated) DEVICE — SYRINGE IRRIG 60ML SFT PLIABLE BLB EZ TO GRP 1 HND USE W/

## (undated) DEVICE — COVER LT HNDL BLU PLAS

## (undated) DEVICE — SYRINGE MED 30ML STD CLR PLAS LUERLOCK TIP N CTRL DISP

## (undated) DEVICE — SURGICAL SUCTION CONNECTING TUBE WITH MALE CONNECTOR AND SUCTION CLAMP, 2 FT. LONG (.6 M), 5 MM I.D.: Brand: CONMED

## (undated) DEVICE — SOLUTION IV IRRIG 500ML 0.9% SODIUM CHL 2F7123

## (undated) DEVICE — DRAPE MICSCP W54XL150IN W/ 4 BINOC GLS LENS LEICA

## (undated) DEVICE — TUBING, SUCTION, 1/4" X 12', STRAIGHT: Brand: MEDLINE

## (undated) DEVICE — SUTURE VCRL SZ 2-0 L18IN ABSRB UD CT-1 L36MM 1/2 CIR J839D

## (undated) DEVICE — FLOSEAL HEMOSTATIC MATRIX, 5 ML: Brand: FLOSEAL

## (undated) DEVICE — GOWN,AURORA,NONREINF,RAGLAN,XXL,STERILE: Brand: MEDLINE

## (undated) DEVICE — COUNTER NDL 40 COUNT HLD 70 NUM FOAM BLK SGL MAG W BLDE REMV

## (undated) DEVICE — MERCY HEALTH WEST TURNOVER: Brand: MEDLINE INDUSTRIES, INC.

## (undated) DEVICE — Z DISCONTINUED BY MEDLINE USE 2711682 TRAY SKIN PREP DRY W/ PREM GLV

## (undated) DEVICE — SOLUTION SCRB 4OZ 10% POVIDONE IOD ANTIMIC BTL

## (undated) DEVICE — CANISTER, RIGID, 1200CC: Brand: MEDLINE INDUSTRIES, INC.

## (undated) DEVICE — SOLUTION PREP POVIDONE IOD FOR SKIN MUCOUS MEM PRIOR TO

## (undated) DEVICE — GAUZE,SPONGE,4"X4",16PLY,XRAY,STRL,LF: Brand: MEDLINE

## (undated) DEVICE — KIT JACK TBL PT CARE

## (undated) DEVICE — NEEDLE HYPO 25GA L1.5IN BVL ORIENTED ECLIPSE

## (undated) DEVICE — TOOL 14MH30 LEGEND 14CM 3MM: Brand: MIDAS REX ™

## (undated) DEVICE — 3M™ IOBAN™ 2 ANTIMICROBIAL INCISE DRAPE 6650EZ: Brand: IOBAN™ 2

## (undated) DEVICE — 1016 S-DRAPE IRRIG POUCH 10/BOX: Brand: STERI-DRAPE™

## (undated) DEVICE — TOWEL,OR,DSP,ST,BLUE,STD,4/PK,20PK/CS: Brand: MEDLINE

## (undated) DEVICE — SYRINGE 20ML LL S/C 50

## (undated) DEVICE — DISPOSABLE 9450047 NEW PED PROBE LMBR

## (undated) DEVICE — UNDERGLOVE SURG SZ 8 FNGR THK0.21MIL GRN LTX BEAD CUF

## (undated) DEVICE — SPONGE GZ W4XL4IN COT 12 PLY TYP VII WVN C FLD DSGN

## (undated) DEVICE — SPONGE NEURO STRP 1 STRIN IIN X 1IN

## (undated) DEVICE — SUTURE ETHLN SZ 3-0 L18IN NONABSORBABLE BLK FS-1 L24MM 3/8 663H

## (undated) DEVICE — GLOVE SURG SZ 8 CRM LTX FREE POLYISOPRENE POLYMER BEAD ANTI